# Patient Record
Sex: MALE | Race: WHITE | Employment: FULL TIME | URBAN - METROPOLITAN AREA
[De-identification: names, ages, dates, MRNs, and addresses within clinical notes are randomized per-mention and may not be internally consistent; named-entity substitution may affect disease eponyms.]

---

## 2019-02-24 ENCOUNTER — HOSPITAL ENCOUNTER (INPATIENT)
Facility: HOSPITAL | Age: 29
LOS: 3 days | Discharge: HOME | DRG: 386 | End: 2019-02-27
Attending: EMERGENCY MEDICINE | Admitting: INTERNAL MEDICINE
Payer: COMMERCIAL

## 2019-02-24 ENCOUNTER — APPOINTMENT (OUTPATIENT)
Dept: CT IMAGING | Facility: HOSPITAL | Age: 29
DRG: 386 | End: 2019-02-24
Attending: EMERGENCY MEDICINE
Payer: COMMERCIAL

## 2019-02-24 DIAGNOSIS — K51.00 PANCOLITIS (HCC): Primary | ICD-10-CM

## 2019-02-24 DIAGNOSIS — R50.9 ACUTE FEBRILE ILLNESS: ICD-10-CM

## 2019-02-24 PROBLEM — R73.9 HYPERGLYCEMIA: Status: ACTIVE | Noted: 2019-02-24

## 2019-02-24 PROBLEM — E87.6 HYPOKALEMIA: Status: ACTIVE | Noted: 2019-02-24

## 2019-02-24 LAB
ADENOVIRUS F 40/41 PCR: NEGATIVE
ALBUMIN SERPL-MCNC: 3.3 G/DL (ref 3.4–5)
ALP LIVER SERPL-CCNC: 47 U/L (ref 45–117)
ALT SERPL-CCNC: 16 U/L (ref 16–61)
ANION GAP SERPL CALC-SCNC: 9 MMOL/L (ref 0–18)
AST SERPL-CCNC: 11 U/L (ref 15–37)
ASTROVIRUS PCR: NEGATIVE
BASOPHILS # BLD AUTO: 0.03 X10(3) UL (ref 0–0.2)
BASOPHILS # BLD: 0 X10(3) UL (ref 0–0.2)
BASOPHILS NFR BLD AUTO: 0.5 %
BASOPHILS NFR BLD: 0 %
BILIRUB DIRECT SERPL-MCNC: 0.5 MG/DL (ref 0–0.2)
BILIRUB SERPL-MCNC: 1.8 MG/DL (ref 0.1–2)
BILIRUB UR QL: NEGATIVE
BUN BLD-MCNC: 10 MG/DL (ref 7–18)
BUN/CREAT SERPL: 8.5 (ref 10–20)
C CAYETANENSIS DNA SPEC QL NAA+PROBE: NEGATIVE
C. DIFFICILE TOXIN A/B PCR: NEGATIVE
CALCIUM BLD-MCNC: 8.6 MG/DL (ref 8.5–10.1)
CAMPY SP DNA.DIARRHEA STL QL NAA+PROBE: NEGATIVE
CHLORIDE SERPL-SCNC: 106 MMOL/L (ref 98–107)
CLARITY UR: CLEAR
CO2 SERPL-SCNC: 24 MMOL/L (ref 21–32)
COLOR UR: YELLOW
CREAT BLD-MCNC: 1.17 MG/DL (ref 0.7–1.3)
CRYPTOSP DNA SPEC QL NAA+PROBE: NEGATIVE
DEPRECATED RDW RBC AUTO: 37.9 FL (ref 35.1–46.3)
DEPRECATED RDW RBC AUTO: 38 FL (ref 35.1–46.3)
DOHLE BOD BLD QL SMEAR: PRESENT
EAEC PAA PLAS AGGR+AATA ST NAA+NON-PRB: NEGATIVE
EC STX1+STX2 + H7 FLIC SPEC NAA+PROBE: NEGATIVE
ENTAMOEBA HISTOLYTICA PCR: NEGATIVE
EOSINOPHIL # BLD AUTO: 0 X10(3) UL (ref 0–0.7)
EOSINOPHIL # BLD: 0 X10(3) UL (ref 0–0.7)
EOSINOPHIL NFR BLD AUTO: 0 %
EOSINOPHIL NFR BLD: 0 %
EPEC EAE GENE STL QL NAA+NON-PROBE: NEGATIVE
ERYTHROCYTE [DISTWIDTH] IN BLOOD BY AUTOMATED COUNT: 11.9 % (ref 11–15)
ERYTHROCYTE [DISTWIDTH] IN BLOOD BY AUTOMATED COUNT: 11.9 % (ref 11–15)
ETEC LTA+ST1A+ST1B TOX ST NAA+NON-PROBE: NEGATIVE
GIARDIA LAMBLIA PCR: NEGATIVE
GLUCOSE BLD-MCNC: 119 MG/DL (ref 70–99)
GLUCOSE UR-MCNC: NEGATIVE MG/DL
HCT VFR BLD AUTO: 33.1 % (ref 39–53)
HCT VFR BLD AUTO: 38.3 % (ref 39–53)
HGB BLD-MCNC: 11.7 G/DL (ref 13–17.5)
HGB BLD-MCNC: 13.6 G/DL (ref 13–17.5)
HGB UR QL STRIP.AUTO: NEGATIVE
IMM GRANULOCYTES # BLD AUTO: 0.06 X10(3) UL (ref 0–1)
IMM GRANULOCYTES NFR BLD: 1 %
KETONES UR-MCNC: NEGATIVE MG/DL
LACTATE SERPL-SCNC: 1 MMOL/L (ref 0.4–2)
LACTATE SERPL-SCNC: 1.4 MMOL/L (ref 0.4–2)
LEUKOCYTE ESTERASE UR QL STRIP.AUTO: NEGATIVE
LIPASE SERPL-CCNC: 103 U/L (ref 73–393)
LYMPHOCYTES # BLD AUTO: 0.59 X10(3) UL (ref 1–4)
LYMPHOCYTES NFR BLD AUTO: 9.7 %
LYMPHOCYTES NFR BLD: 1.72 X10(3) UL (ref 1–4)
LYMPHOCYTES NFR BLD: 21 %
M PROTEIN MFR SERPL ELPH: 7.2 G/DL (ref 6.4–8.2)
MCH RBC QN AUTO: 31.3 PG (ref 26–34)
MCH RBC QN AUTO: 31.5 PG (ref 26–34)
MCHC RBC AUTO-ENTMCNC: 35.3 G/DL (ref 31–37)
MCHC RBC AUTO-ENTMCNC: 35.5 G/DL (ref 31–37)
MCV RBC AUTO: 88.5 FL (ref 80–100)
MCV RBC AUTO: 88.7 FL (ref 80–100)
METAMYELOCYTES # BLD: 0.08 X10(3) UL
METAMYELOCYTES NFR BLD: 1 %
MONOCYTES # BLD AUTO: 1.15 X10(3) UL (ref 0.1–1)
MONOCYTES # BLD: 1.31 X10(3) UL (ref 0.1–1)
MONOCYTES NFR BLD AUTO: 18.9 %
MONOCYTES NFR BLD: 16 %
NEUTROPHILS # BLD AUTO: 4.24 X10 (3) UL (ref 1.5–7.7)
NEUTROPHILS # BLD AUTO: 4.24 X10(3) UL (ref 1.5–7.7)
NEUTROPHILS # BLD AUTO: 5.49 X10 (3) UL (ref 1.5–7.7)
NEUTROPHILS NFR BLD AUTO: 69.9 %
NEUTROPHILS NFR BLD: 37 %
NEUTS BAND NFR BLD: 25 %
NEUTS HYPERSEG # BLD: 5.08 X10(3) UL (ref 1.5–7.7)
NEUTS VAC BLD QL SMEAR: PRESENT
NITRITE UR QL STRIP.AUTO: NEGATIVE
NOROVIRUS GI/GII PCR: NEGATIVE
OSMOLALITY SERPL CALC.SUM OF ELEC: 288 MOSM/KG (ref 275–295)
P SHIGELLOIDES DNA STL QL NAA+PROBE: NEGATIVE
PH UR: 5 [PH] (ref 5–8)
PLATELET # BLD AUTO: 163 10(3)UL (ref 150–450)
PLATELET # BLD AUTO: 189 10(3)UL (ref 150–450)
PLATELET MORPHOLOGY: NORMAL
POTASSIUM SERPL-SCNC: 3.1 MMOL/L (ref 3.5–5.1)
PROT UR-MCNC: NEGATIVE MG/DL
RBC # BLD AUTO: 3.74 X10(6)UL (ref 4.3–5.7)
RBC # BLD AUTO: 4.32 X10(6)UL (ref 4.3–5.7)
ROTAVIRUS A PCR: NEGATIVE
SALMONELLA DNA SPEC QL NAA+PROBE: NEGATIVE
SAPOVIRUS PCR: NEGATIVE
SHIGELLA SP+EIEC IPAH ST NAA+NON-PROBE: POSITIVE
SODIUM SERPL-SCNC: 139 MMOL/L (ref 136–145)
SP GR UR STRIP: 1.02 (ref 1–1.03)
TOTAL CELLS COUNTED: 100
TOXIC GRANULES BLD QL SMEAR: PRESENT
UROBILINOGEN UR STRIP-ACNC: <2
V CHOLERAE DNA SPEC QL NAA+PROBE: NEGATIVE
VIBRIO DNA SPEC NAA+PROBE: NEGATIVE
VIT C UR-MCNC: NEGATIVE MG/DL
WBC # BLD AUTO: 6.1 X10(3) UL (ref 4–11)
WBC # BLD AUTO: 8.2 X10(3) UL (ref 4–11)
YERSINIA DNA SPEC NAA+PROBE: NEGATIVE

## 2019-02-24 PROCEDURE — 74177 CT ABD & PELVIS W/CONTRAST: CPT | Performed by: EMERGENCY MEDICINE

## 2019-02-24 PROCEDURE — 99222 1ST HOSP IP/OBS MODERATE 55: CPT | Performed by: INTERNAL MEDICINE

## 2019-02-24 RX ORDER — ONDANSETRON 2 MG/ML
4 INJECTION INTRAMUSCULAR; INTRAVENOUS EVERY 6 HOURS PRN
Status: DISCONTINUED | OUTPATIENT
Start: 2019-02-24 | End: 2019-02-24

## 2019-02-24 RX ORDER — MORPHINE SULFATE 2 MG/ML
2 INJECTION, SOLUTION INTRAMUSCULAR; INTRAVENOUS EVERY 4 HOURS PRN
Status: DISCONTINUED | OUTPATIENT
Start: 2019-02-24 | End: 2019-02-27

## 2019-02-24 RX ORDER — SODIUM CHLORIDE 9 MG/ML
INJECTION, SOLUTION INTRAVENOUS
Status: COMPLETED
Start: 2019-02-24 | End: 2019-02-24

## 2019-02-24 RX ORDER — SODIUM CHLORIDE 9 MG/ML
INJECTION, SOLUTION INTRAVENOUS CONTINUOUS
Status: DISCONTINUED | OUTPATIENT
Start: 2019-02-24 | End: 2019-02-27

## 2019-02-24 RX ORDER — HEPARIN SODIUM 5000 [USP'U]/ML
5000 INJECTION, SOLUTION INTRAVENOUS; SUBCUTANEOUS EVERY 8 HOURS SCHEDULED
Status: DISCONTINUED | OUTPATIENT
Start: 2019-02-24 | End: 2019-02-24

## 2019-02-24 RX ORDER — IBUPROFEN 600 MG/1
600 TABLET ORAL ONCE
Status: COMPLETED | OUTPATIENT
Start: 2019-02-24 | End: 2019-02-24

## 2019-02-24 RX ORDER — ONDANSETRON 2 MG/ML
4 INJECTION INTRAMUSCULAR; INTRAVENOUS ONCE
Status: COMPLETED | OUTPATIENT
Start: 2019-02-24 | End: 2019-02-24

## 2019-02-24 RX ORDER — ONDANSETRON 2 MG/ML
4 INJECTION INTRAMUSCULAR; INTRAVENOUS EVERY 6 HOURS PRN
Status: DISCONTINUED | OUTPATIENT
Start: 2019-02-24 | End: 2019-02-27

## 2019-02-24 RX ORDER — SODIUM CHLORIDE 0.9 % (FLUSH) 0.9 %
3 SYRINGE (ML) INJECTION AS NEEDED
Status: DISCONTINUED | OUTPATIENT
Start: 2019-02-24 | End: 2019-02-27

## 2019-02-24 RX ORDER — ACETAMINOPHEN 325 MG/1
650 TABLET ORAL EVERY 6 HOURS PRN
Status: DISCONTINUED | OUTPATIENT
Start: 2019-02-24 | End: 2019-02-27

## 2019-02-24 RX ORDER — POTASSIUM CHLORIDE 14.9 MG/ML
20 INJECTION INTRAVENOUS ONCE
Status: COMPLETED | OUTPATIENT
Start: 2019-02-25 | End: 2019-02-25

## 2019-02-24 RX ORDER — HEPARIN SODIUM 5000 [USP'U]/ML
5000 INJECTION, SOLUTION INTRAVENOUS; SUBCUTANEOUS EVERY 8 HOURS SCHEDULED
Status: DISCONTINUED | OUTPATIENT
Start: 2019-02-24 | End: 2019-02-27

## 2019-02-24 NOTE — ED NOTES
Orders for admission, patient is aware of plan and ready to go upstairs. Any questions, please call ED RN Meg Saldivar  at extension [phone]    Blood cultures completed. Will give IV Antibiotics prior to coming to the floor.

## 2019-02-24 NOTE — ED PROVIDER NOTES
Patient Seen in: [facility] AND [facility] Emergency Department    History   Patient presents with:  Nausea/Vomiting/Diarrhea (gastrointestinal)    Stated Complaint: N/V/D    HPI    Patient presents the emergency department complaining of 4 days of abdominal eric Cardiovascular: Normal rate and regular rhythm. No murmur heard. Pulmonary/Chest: Effort normal and breath sounds normal. No respiratory distress. Abdominal: Soft. He exhibits no distension. There is tenderness in the left lower quadrant.  There is n Abnormal            Final result                 Please view results for these tests on the individual orders.    URINALYSIS WITH CULTURE REFLEX   SCAN SLIDE   GI STOOL PANEL BY PCR   BLOOD CULTURE   BLOOD CULTURE                MDM   Pulse Ox: 100%, Normal ICD-10-CM Noted POA    Hyperglycemia R73.9 2/24/2019 Yes    Hypokalemia E87.6 2/24/2019 Yes    Pancolitis ([address] K51.00 2/24/2019 Unknown

## 2019-02-24 NOTE — ED INITIAL ASSESSMENT (HPI)
Patient here with lower abdominal pain and N/V/D since Wednesday night with fevers on and off. Denies recent sick contacts.

## 2019-02-24 NOTE — CONSULTS
[facility]    Report of Consultation    Mackenzie Strange Patient Status:  Inpatient    1990 MRN [id number]   Location [facility] 5SW/SE Attending Eze Peoples MD   Hosp Day # 0 PCP Via Cynthia Taylor      Date of Admission: 0.5ml 0.5 mL Intramuscular Prior to discharge       No medications prior to admission.     Allergies  No Known Allergies    Review of Systems:   GENERAL HEALTH +body aches  SKIN: denies any unusual skin lesions or rashes  EYES: no visual complaints or defic obtained with non-ionic intravenous contrast material.  Automated exposure control for dose reduction was used. Adjustment of the mA and/or kV was done based on the patient's size. Use of iterative reconstruction technique for dose reduction was used.    FI Dictated by (CST): Tracie Mi MD on 2/24/2019 at 10:46     Approved by (CST): Tracie Mi MD on 2/24/2019 at 10:55             Impression:   Mr Zarina Engel is a 32 y/o male that presents with n/v and diarrhea.  Possibly bloody stools but patient unsur

## 2019-02-25 PROBLEM — D62 ACUTE BLOOD LOSS ANEMIA: Status: ACTIVE | Noted: 2019-02-25

## 2019-02-25 LAB
ALBUMIN SERPL-MCNC: 2.5 G/DL (ref 3.4–5)
ALBUMIN/GLOB SERPL: 0.8 {RATIO} (ref 1–2)
ALP LIVER SERPL-CCNC: 34 U/L (ref 45–117)
ALT SERPL-CCNC: 11 U/L (ref 16–61)
ANION GAP SERPL CALC-SCNC: 7 MMOL/L (ref 0–18)
AST SERPL-CCNC: 8 U/L (ref 15–37)
BASOPHILS # BLD: 0.06 X10(3) UL (ref 0–0.2)
BASOPHILS NFR BLD: 1 %
BILIRUB SERPL-MCNC: 1.1 MG/DL (ref 0.1–2)
BUN BLD-MCNC: 9 MG/DL (ref 7–18)
BUN/CREAT SERPL: 8.3 (ref 10–20)
CALCIUM BLD-MCNC: 8 MG/DL (ref 8.5–10.1)
CHLORIDE SERPL-SCNC: 109 MMOL/L (ref 98–107)
CO2 SERPL-SCNC: 23 MMOL/L (ref 21–32)
CREAT BLD-MCNC: 1.08 MG/DL (ref 0.7–1.3)
DEPRECATED RDW RBC AUTO: 38.2 FL (ref 35.1–46.3)
DOHLE BOD BLD QL SMEAR: PRESENT
EOSINOPHIL # BLD: 0 X10(3) UL (ref 0–0.7)
EOSINOPHIL NFR BLD: 0 %
ERYTHROCYTE [DISTWIDTH] IN BLOOD BY AUTOMATED COUNT: 11.9 % (ref 11–15)
GLOBULIN PLAS-MCNC: 3.2 G/DL (ref 2.8–4.4)
GLUCOSE BLD-MCNC: 106 MG/DL (ref 70–99)
HAV IGM SER QL: 1.7 MG/DL (ref 1.6–2.6)
HCT VFR BLD AUTO: 30.8 % (ref 39–53)
HGB BLD-MCNC: 10.8 G/DL (ref 13–17.5)
LYMPHOCYTES NFR BLD: 0.91 X10(3) UL (ref 1–4)
LYMPHOCYTES NFR BLD: 16 %
M PROTEIN MFR SERPL ELPH: 5.7 G/DL (ref 6.4–8.2)
MCH RBC QN AUTO: 30.9 PG (ref 26–34)
MCHC RBC AUTO-ENTMCNC: 35.1 G/DL (ref 31–37)
MCV RBC AUTO: 88 FL (ref 80–100)
MONOCYTES # BLD: 0.74 X10(3) UL (ref 0.1–1)
MONOCYTES NFR BLD: 13 %
MORPHOLOGY: NORMAL
NEUTROPHILS # BLD AUTO: 3.66 X10 (3) UL (ref 1.5–7.7)
NEUTROPHILS NFR BLD: 17 %
NEUTS BAND NFR BLD: 53 %
NEUTS HYPERSEG # BLD: 3.99 X10(3) UL (ref 1.5–7.7)
OSMOLALITY SERPL CALC.SUM OF ELEC: 287 MOSM/KG (ref 275–295)
PLATELET # BLD AUTO: 150 10(3)UL (ref 150–450)
PLATELET MORPHOLOGY: NORMAL
POTASSIUM SERPL-SCNC: 3.3 MMOL/L (ref 3.5–5.1)
POTASSIUM SERPL-SCNC: 3.5 MMOL/L (ref 3.5–5.1)
RBC # BLD AUTO: 3.5 X10(6)UL (ref 4.3–5.7)
SODIUM SERPL-SCNC: 139 MMOL/L (ref 136–145)
TOTAL CELLS COUNTED: 100
TOXIC GRANULES BLD QL SMEAR: PRESENT
WBC # BLD AUTO: 5.7 X10(3) UL (ref 4–11)

## 2019-02-25 PROCEDURE — 99232 SBSQ HOSP IP/OBS MODERATE 35: CPT | Performed by: INTERNAL MEDICINE

## 2019-02-25 RX ORDER — MAGNESIUM SULFATE HEPTAHYDRATE 40 MG/ML
2 INJECTION, SOLUTION INTRAVENOUS ONCE
Status: COMPLETED | OUTPATIENT
Start: 2019-02-25 | End: 2019-02-25

## 2019-02-25 NOTE — CONSULTS
INFECTIOUS DISEASE CONSULT NOTE    Mik Robert Patient Status:  Inpatient    1990 MRN [id number]   Location [facility] 5SW/SE Attending Yanely Alcantara MD   Hosp Day # 1 PCP JUAN PABLO WHEELER 650 mg, Oral, Q6H PRN  •  morphINE sulfate (PF) 2 MG/ML injection 2 mg, 2 mg, Intravenous, Q4H PRN  •  Heparin Sodium (Porcine) 5000 UNIT/ML injection 5,000 Units, 5,000 Units, Subcutaneous, Q8H [address]  •  ondansetron HCl (ZOFRAN) injection 4 mg, 4 mg, Nemesio Lang --   139   K  3.1*  3.5  3.3*   CL  106   --   109*   CO2  24.0   --   23.0   ALKPHO  47   --   34*   AST  11*   --   8*   ALT  16   --   11*   BILT  1.8   --   1.1   TP  7.2   --   5.7*       Microbiology    Reviewed in EMR    Radiology: Reviewed      A

## 2019-02-25 NOTE — PROGRESS NOTES
[facility]    Progress Note    Trace Rudd Patient Status:  Inpatient    1990 MRN [id number]   Location [facility] 5SW/SE Attending Diane Francisco MD   Hosp Day # 1 PCP JUAN PABLO Gold        Subjective:   Subjective 30.8 (L) 02/25/2019    .0 02/25/2019    CREATSERUM 1.08 02/25/2019    BUN 9 02/25/2019     02/25/2019    K 3.3 (L) 02/25/2019     (H) 02/25/2019    CO2 23.0 02/25/2019     (H) 02/25/2019    CA 8.0 (L) 02/25/2019    ALB 2.5 (L) 02/

## 2019-02-25 NOTE — PLAN OF CARE
Problem: Patient Centered Care  Goal: Patient preferences are identified and integrated in the patient's plan of care  Interventions:  - What would you like us to know as we care for you? I live with my parents.   - Provide timely, complete, and accurate in mobilization and activity  - Obtain nutritional consult as needed  - Establish a toileting routine/schedule  - Consider collaborating with pharmacy to review patient's medication profile  Outcome: Progressing      Problem: METABOLIC/FLUID AND ELECTROLYTES

## 2019-02-25 NOTE — H&P
[facility]    History and Physical    Paola Shaker Patient Status:  Inpatient    1990 MRN [id number]   Location [facility] 5SW/SE Attending Kathy Mera MD   Hosp Day # 0 PCP JUAN PABLO Gillespie     Date:  2019  Date intolerance. Genitourinary: Negative for dysuria and urgency. Musculoskeletal: Negative for back pain, joint pain and gait problem. Allergic/Immunologic: Negative for food allergies.    Neurological: Negative for dizziness, facial asymmetry and headac Only)(cpt=74177)    Result Date: 2/24/2019  CONCLUSION:   Wall thickening and mucosal hyperemia involving the terminal ileum and colon. Findings are compatible with nonspecific terminal ileitis with pancolitis.  Infectious and inflammatory processes are fa

## 2019-02-25 NOTE — PLAN OF CARE
Problem: Patient/Family Goals  Goal: Patient/Family Long Term Goal  Patient's Long Term Goal: Go home    Interventions:  - Fluid resuscitation  - Antibiotic  - Monitor Lab trends  - Isolation Precautions  - Education for patient and visitors re: prevention

## 2019-02-25 NOTE — PROGRESS NOTES
[facility]  Gastroenterology Progress Note    Paola Jernigan Patient Status:  Inpatient    1990 MRN [id number]   Location [facility] 5SW/SE Attending Kathy Mera MD   Hosp Day # 1 PCP JUAN PABLO Friend     Subjective:  Author Charbel Rashad Lopez MD on 2/24/2019 at 10:46     Approved by (CST): Rashad Lopez MD on 2/24/2019 at 10:55            Problem list:  Patient Active Problem List:     Hypokalemia     Hyperglycemia     Pancolitis ([address]     Acute febrile illness     Acute b

## 2019-02-25 NOTE — PAYOR COMM NOTE
--------------  ADMISSION REVIEW     Payor: [address]  Subscriber #:  [id number]  Authorization Number: [id number]    Admit date: 2/24/19  Admit time: [address]       Admitting Physician: Ewing Kehr, MD  Attending Physician:  Ewing Kehr, Aloha Frieze °F (39.4 °C)   Temp src Oral   SpO2 100 %   O2 Device None (Room air)       Current:/61   Pulse 87   Temp 98.7 °F (37.1 °C) (Oral)   Resp 20   Wt 72.6 kg   SpO2 96%         Physical Exam   Constitutional: He is oriented to person, place, and time.  He Vacuolated Neutrophils Present (*)     All other components within normal limits   CBC W/ DIFFERENTIAL - Abnormal; Notable for the following components:    HCT 38.3 (*)     All other components within normal limits   LIPASE - Normal   LACTIC ACID, PLASMA - Stool specimen obtained. Given IV fluids and blood cultures were sent. Case was discussed with the on-call internal medicine doctor as well as GI. Now that stool culture and blood cultures are obtained, GI recommends initiating a dose of Zosyn.   Res abdominal cramps especially in the lower abdomen. Denied any chest pain or shortness of breath. Past medical history none  Past surgical history none    Family history denied any cancer. Mom has a TIA. Mom has cardiomyopathy.   He does not smoke  Histor normal heart sounds and intact distal pulses. No murmur heard. Edema not present. Pulmonary/Chest: Effort normal and breath sounds normal. He has no rales. Abdominal: Soft. Bowel sounds are normal. There is no tenderness.  There is no rebound and no infectious diarrhea from [place], GI consulted.   We will continue with IV fluids and IV antibiotics      Hypokalemia secondary from diarrhea will follow electrolyte protocol        Acute febrile illness secondary from above    Lactic acidosis resolved - Date Action Dose Route User    2/25/2019 2051 [facility] (none) [facility]    2/25/2019 0111 New Bag (none) Intravenous Seema Uribe RN               Antibiotics: IV ceftriaxone; (IV zosyn)        ASSESSMENT:  67-year-old male with no

## 2019-02-26 LAB
ANION GAP SERPL CALC-SCNC: 7 MMOL/L (ref 0–18)
BUN BLD-MCNC: 6 MG/DL (ref 7–18)
BUN/CREAT SERPL: 6.9 (ref 10–20)
CALCIUM BLD-MCNC: 8 MG/DL (ref 8.5–10.1)
CHLORIDE SERPL-SCNC: 108 MMOL/L (ref 98–107)
CO2 SERPL-SCNC: 25 MMOL/L (ref 21–32)
CREAT BLD-MCNC: 0.87 MG/DL (ref 0.7–1.3)
DEPRECATED RDW RBC AUTO: 38.4 FL (ref 35.1–46.3)
ERYTHROCYTE [DISTWIDTH] IN BLOOD BY AUTOMATED COUNT: 11.9 % (ref 11–15)
GLUCOSE BLD-MCNC: 94 MG/DL (ref 70–99)
HAV AB SER QL IA: NONREACTIVE
HAV IGM SER QL: 2.2 MG/DL (ref 1.6–2.6)
HBV CORE AB SERPL QL IA: NONREACTIVE
HBV SURFACE AB SER QL: REACTIVE
HBV SURFACE AB SERPL IA-ACNC: 25.71 MIU/ML
HBV SURFACE AG SERPL QL IA: NONREACTIVE
HCT VFR BLD AUTO: 31.8 % (ref 39–53)
HCV AB SERPL QL IA: NONREACTIVE
HGB BLD-MCNC: 10.9 G/DL (ref 13–17.5)
MCH RBC QN AUTO: 30.5 PG (ref 26–34)
MCHC RBC AUTO-ENTMCNC: 34.3 G/DL (ref 31–37)
MCV RBC AUTO: 89.1 FL (ref 80–100)
OSMOLALITY SERPL CALC.SUM OF ELEC: 287 MOSM/KG (ref 275–295)
PLATELET # BLD AUTO: 172 10(3)UL (ref 150–450)
POTASSIUM SERPL-SCNC: 3.1 MMOL/L (ref 3.5–5.1)
POTASSIUM SERPL-SCNC: 3.1 MMOL/L (ref 3.5–5.1)
POTASSIUM SERPL-SCNC: 3.3 MMOL/L (ref 3.5–5.1)
RBC # BLD AUTO: 3.57 X10(6)UL (ref 4.3–5.7)
SODIUM SERPL-SCNC: 140 MMOL/L (ref 136–145)
WBC # BLD AUTO: 5.1 X10(3) UL (ref 4–11)

## 2019-02-26 PROCEDURE — 99231 SBSQ HOSP IP/OBS SF/LOW 25: CPT | Performed by: INTERNAL MEDICINE

## 2019-02-26 RX ORDER — POTASSIUM CHLORIDE 14.9 MG/ML
20 INJECTION INTRAVENOUS ONCE
Status: COMPLETED | OUTPATIENT
Start: 2019-02-26 | End: 2019-02-26

## 2019-02-26 NOTE — PROGRESS NOTES
[facility] AND [facility]  Gastroenterology Progress Note    Virl Distad Patient Status:  Inpatient    1990 MRN [id number]   Location [facility] 5SW/SE Attending Sukhwinder Dixon MD   Hosp Day # 2 PCP JUAN PABLO Pascual     Subjective:  Vazquez Young

## 2019-02-26 NOTE — PROGRESS NOTES
[facility]    Progress Note    Bienvenido Lopez Patient Status:  Inpatient    1990 MRN [id number]   Location [facility] 5SW/SE Attending Ellen Rojas MD   Hosp Day # 2 PCP JUAN PABLO Pinto        Subjective:   Subjective Results   Component Value Date    WBC 5.1 02/26/2019    HGB 10.9 (L) 02/26/2019    HCT 31.8 (L) 02/26/2019    .0 02/26/2019    CREATSERUM 0.87 02/26/2019    BUN 6 (L) 02/26/2019     02/26/2019    K 3.1 (L) 02/26/2019    K 3.1 (L) 02/26/2019

## 2019-02-26 NOTE — PROGRESS NOTES
[facility] ID PROGRESS NOTE    Kellen Rainey Patient Status:  Inpatient    1990 MRN [id number]   Location [facility] 5SW/SE Attending Agnes Barajas MD   Hosp Day # 2 PCP JUAN PABLO Starr     Subjective:  Awake, reports decreased stool amount. terminal ileum and colon compatible with nonspecific terminal ileitis and pancolitis. Started on IV Zosyn. Blood culture sent and pending. He has stool PCR positive for enteroinvasive E. Coli.     # Acute diarrhea with pathogenic E.  Coli EIEC w/o bloody

## 2019-02-26 NOTE — PAYOR COMM NOTE
--------------  CONTINUED STAY REVIEW---REQUESTING ADDITIONAL DAY 2/26      Payor: [address]  Subscriber #:  [id number]  Authorization Number: [id number]    Admit date: 2/24/19  Admit time: [address]    Admitting Physician: Humza Pimentel MD  Attestefania Pancolitis ([facility] [address] -secondary from E. coli-overall his symptoms are improving. Continue to have multiple bowel movements. Today morning he already had 2 bowel movements. We will continue with IV ceftriaxone.   Once the stool is formed hopefully will be a 2/25/2019 1642 New Bag 2 g Intravenous Render Chuy RN      potassium chloride 40 mEq in sodium chloride 0.9% 250 mL IVPB     Date Action Dose Route User    2/26/2019 1010 New Bag 40 mEq Intravenous Render KIN Vera      0.9%  NaCl infusion     Date Acti

## 2019-02-26 NOTE — PLAN OF CARE
Problem: Patient/Family Goals  Goal: Patient/Family Short Term Goal  Patient's Short Term Goal: Stop diarrhea and pain    Interventions:   - Pain control and medications  - IV Fluids  - Isolation precautions  - Clear liquid diet  - See additional Care Plan safety including physical limitations  - Instruct pt to call for assistance with activity based on assessment  - Modify environment to reduce risk of injury  - Provide assistive devices as appropriate  - Consider OT/PT consult to assist with strengthening/

## 2019-02-27 VITALS
HEART RATE: 68 BPM | SYSTOLIC BLOOD PRESSURE: 123 MMHG | WEIGHT: 164.19 LBS | RESPIRATION RATE: 20 BRPM | DIASTOLIC BLOOD PRESSURE: 63 MMHG | OXYGEN SATURATION: 97 % | HEIGHT: 70 IN | BODY MASS INDEX: 23.51 KG/M2 | TEMPERATURE: 98 F

## 2019-02-27 LAB
ANION GAP SERPL CALC-SCNC: 7 MMOL/L (ref 0–18)
BUN BLD-MCNC: 7 MG/DL (ref 7–18)
BUN/CREAT SERPL: 9.6 (ref 10–20)
CALCIUM BLD-MCNC: 8.2 MG/DL (ref 8.5–10.1)
CHLORIDE SERPL-SCNC: 111 MMOL/L (ref 98–107)
CO2 SERPL-SCNC: 23 MMOL/L (ref 21–32)
CREAT BLD-MCNC: 0.73 MG/DL (ref 0.7–1.3)
GLUCOSE BLD-MCNC: 89 MG/DL (ref 70–99)
OSMOLALITY SERPL CALC.SUM OF ELEC: 289 MOSM/KG (ref 275–295)
POTASSIUM SERPL-SCNC: 4 MMOL/L (ref 3.5–5.1)
SODIUM SERPL-SCNC: 141 MMOL/L (ref 136–145)

## 2019-02-27 PROCEDURE — 99238 HOSP IP/OBS DSCHRG MGMT 30/<: CPT | Performed by: INTERNAL MEDICINE

## 2019-02-27 RX ORDER — CIPROFLOXACIN 500 MG/1
500 TABLET, FILM COATED ORAL 2 TIMES DAILY
Qty: 6 TABLET | Refills: 0 | Status: SHIPPED | OUTPATIENT
Start: 2019-02-27 | End: 2019-03-02

## 2019-02-27 NOTE — DISCHARGE SUMMARY
[facility]    Discharge Summary    Kellen Rainey Patient Status:  Inpatient    1990 MRN [id number]   Location [facility] 5SW/SE Attending Agnes Barajas MD   Hosp Day # 3 PCP JUAN PABLO Malloy     Date of Admission:  bowel sounds. No rebound tenderness  Neurologic: No focal neurological deficits. Musculoskeletal: Motor strength 5/5  Integument: No lesions. No erythema. Psychiatric: Appropriate mood and affect.     Pancolitis ([address] -secondary from E. coli-overall his s 21  59-90 High Risk  29-58 Medium Risk  0-28   Low Risk. TCM Follow-Up Recommendation:  LACE < 29: Low Risk of readmission after discharge from the hospital. No TCM follow-up needed.     Time spent:  Less than 30 minutes, more than 50% of the time was sp

## 2019-02-27 NOTE — PLAN OF CARE
Patient discharge home with mother at bedside, IV removed, Informed patient of discharge plan, inform pt to follow up with PCP. Patient verbalized understanding of discharge.

## 2019-02-27 NOTE — PROGRESS NOTES
[facility]ESS [facility]    Rochelle Kumar Patient Status:  Inpatient    1990 MRN [id number]   Location [facility] 5SW/SE Attending Mika Soni MD   Hosp Day # 3 PCP JUAN PABLO GARCIA     Subjective:  Tmax 99.0. Five BMs overnight.  Three B febrile to 103 with WBC of 8.2. CT A/P with wall thickening and mucosal hyperemia of the terminal ileum and colon compatible with nonspecific terminal ileitis and pancolitis. Started on IV Zosyn. Blood culture sent and pending.   He has stool PCR positiv

## 2019-02-27 NOTE — PLAN OF CARE
Problem: Patient Centered Care  Goal: Patient preferences are identified and integrated in the patient's plan of care  Interventions:  - What would you like us to know as we care for you? I live with my parents.   - Provide timely, complete, and accurate in effectiveness of GI medications  - Encourage mobilization and activity  - Obtain nutritional consult as needed  - Establish a toileting routine/schedule  - Consider collaborating with pharmacy to review patient's medication profile  Outcome: Adequate for D

## 2019-02-28 NOTE — PAYOR COMM NOTE
--------------  DISCHARGE REVIEW    Payor: [address] [facility]  Subscriber #:  [id number]  Authorization Number: [id number]    Admit date: 2/24/19  Admit time:  2946  Discharge Date: 2/27/2019  5:19 PM     Admitting Physician: Lori Arita MD  Attend morning he had 2 small episodes of pasty stool. Denied any abdominal pain. Denied any fever. No more blood in the stool.     Discharge Physical Exam:  Vital Signs:  Blood pressure 134/55, pulse 54, temperature 98.3 °F (36.8 °C), temperature source Oral, tolerated    Follow up: Follow-up Information     Edwina Cronin MD In 1 week.     Specialty:  Internal Medicine                   Follow up Labs: Check H&H and BMP in 1 week        Other Discharge Instructions:       Please take the antibiotics as pr

## 2019-03-18 ENCOUNTER — OFFICE VISIT (OUTPATIENT)
Dept: INTERNAL MEDICINE CLINIC | Facility: CLINIC | Age: 29
End: 2019-03-18
Payer: COMMERCIAL

## 2019-03-18 VITALS
HEIGHT: 69 IN | BODY MASS INDEX: 23.99 KG/M2 | WEIGHT: 162 LBS | DIASTOLIC BLOOD PRESSURE: 65 MMHG | SYSTOLIC BLOOD PRESSURE: 126 MMHG | HEART RATE: 61 BPM

## 2019-03-18 DIAGNOSIS — Z00.00 ADULT GENERAL MEDICAL EXAM: Primary | ICD-10-CM

## 2019-03-18 PROBLEM — R73.9 HYPERGLYCEMIA: Status: RESOLVED | Noted: 2019-02-24 | Resolved: 2019-03-18

## 2019-03-18 PROBLEM — R50.9 ACUTE FEBRILE ILLNESS: Status: RESOLVED | Noted: 2019-02-24 | Resolved: 2019-03-18

## 2019-03-18 PROBLEM — E87.6 HYPOKALEMIA: Status: RESOLVED | Noted: 2019-02-24 | Resolved: 2019-03-18

## 2019-03-18 PROBLEM — D62 ACUTE BLOOD LOSS ANEMIA: Status: RESOLVED | Noted: 2019-02-25 | Resolved: 2019-03-18

## 2019-03-18 PROBLEM — K51.00 PANCOLITIS (HCC): Status: RESOLVED | Noted: 2019-02-24 | Resolved: 2019-03-18

## 2019-03-18 PROCEDURE — 1111F DSCHRG MED/CURRENT MED MERGE: CPT | Performed by: INTERNAL MEDICINE

## 2019-03-18 PROCEDURE — 99395 PREV VISIT EST AGE 18-39: CPT | Performed by: INTERNAL MEDICINE

## 2019-03-18 NOTE — PROGRESS NOTES
Renita Green is a 33year old male. Patient presents with:  Hospital F/U: GI problems, much better now  Follow-up in the hospital and physical.  HPI:   20-year-old gentleman here for follow-up physical.  I met him when he was in the hospital for gastroenter Cardiovascular: Negative for chest pain, palpitations and leg swelling. Gastrointestinal: Negative for vomiting, abdominal pain, diarrhea, constipation, blood in stool and abdominal distention.    Endocrine: Negative for cold intolerance and heat intole indicates understanding of these issues and agrees to the plan. No Follow-up on file.

## 2019-07-18 ENCOUNTER — OFFICE VISIT (OUTPATIENT)
Dept: INTERNAL MEDICINE CLINIC | Facility: CLINIC | Age: 29
End: 2019-07-18
Payer: COMMERCIAL

## 2019-07-18 VITALS
WEIGHT: 170 LBS | HEART RATE: 60 BPM | BODY MASS INDEX: 25.18 KG/M2 | DIASTOLIC BLOOD PRESSURE: 78 MMHG | HEIGHT: 69 IN | OXYGEN SATURATION: 97 % | SYSTOLIC BLOOD PRESSURE: 133 MMHG

## 2019-07-18 DIAGNOSIS — R00.2 PALPITATION: ICD-10-CM

## 2019-07-18 DIAGNOSIS — Z82.0: Primary | ICD-10-CM

## 2019-07-18 DIAGNOSIS — Z82.49: ICD-10-CM

## 2019-07-18 LAB
ANION GAP SERPL CALC-SCNC: 7 MMOL/L (ref 0–18)
BUN BLD-MCNC: 16 MG/DL (ref 7–18)
BUN/CREAT SERPL: 15.7 (ref 10–20)
CALCIUM BLD-MCNC: 9.7 MG/DL (ref 8.5–10.1)
CHLORIDE SERPL-SCNC: 104 MMOL/L (ref 98–112)
CO2 SERPL-SCNC: 28 MMOL/L (ref 21–32)
CREAT BLD-MCNC: 1.02 MG/DL (ref 0.7–1.3)
GLUCOSE BLD-MCNC: 91 MG/DL (ref 70–99)
OSMOLALITY SERPL CALC.SUM OF ELEC: 289 MOSM/KG (ref 275–295)
PATIENT FASTING: YES
POTASSIUM SERPL-SCNC: 4.1 MMOL/L (ref 3.5–5.1)
SODIUM SERPL-SCNC: 139 MMOL/L (ref 136–145)
TSI SER-ACNC: 1.25 MIU/ML (ref 0.36–3.74)

## 2019-07-18 PROCEDURE — 36415 COLL VENOUS BLD VENIPUNCTURE: CPT | Performed by: INTERNAL MEDICINE

## 2019-07-18 PROCEDURE — 99213 OFFICE O/P EST LOW 20 MIN: CPT | Performed by: INTERNAL MEDICINE

## 2019-07-18 NOTE — PROGRESS NOTES
Canelo Cobos is a 33year old male. Patient presents with:  Heartburn: discomfort on L-side of chest, rapid heart beats, several episodes in the day, lasts about 10 seconds  Tics: c/o daily muscular twitching throughout the body, it is tiring,     HPI:   28 Negative for chest pain. Gastrointestinal: Negative for vomiting, abdominal pain and abdominal distention. Genitourinary: Negative for hematuria. Skin: Negative for wound. Psychiatric/Behavioral: Negative for behavioral problems.      Wt Readings fr

## 2019-07-19 ENCOUNTER — TELEPHONE (OUTPATIENT)
Dept: INTERNAL MEDICINE CLINIC | Facility: CLINIC | Age: 29
End: 2019-07-19

## 2019-07-19 NOTE — TELEPHONE ENCOUNTER
Patient calling was seen yesterday and given referral for genetic counselor patient called for appointment and they only see cancer patients.     Patient requesting new referral.

## 2019-07-19 NOTE — TELEPHONE ENCOUNTER
I will try to figure out what he can go for genetic testing. I need a week time. Please let patient know.   Thank you

## 2019-09-12 ENCOUNTER — HOSPITAL ENCOUNTER (OUTPATIENT)
Age: 29
Discharge: ANOTHER HOSPITAL | End: 2019-09-12
Attending: EMERGENCY MEDICINE
Payer: COMMERCIAL

## 2019-09-12 ENCOUNTER — HOSPITAL ENCOUNTER (EMERGENCY)
Facility: HOSPITAL | Age: 29
Discharge: HOME | End: 2019-09-12
Attending: EMERGENCY MEDICINE
Payer: COMMERCIAL

## 2019-09-12 ENCOUNTER — APPOINTMENT (OUTPATIENT)
Dept: GENERAL RADIOLOGY | Facility: HOSPITAL | Age: 29
End: 2019-09-12
Attending: EMERGENCY MEDICINE
Payer: COMMERCIAL

## 2019-09-12 VITALS
RESPIRATION RATE: 16 BRPM | OXYGEN SATURATION: 97 % | BODY MASS INDEX: 24 KG/M2 | TEMPERATURE: 97 F | HEART RATE: 51 BPM | DIASTOLIC BLOOD PRESSURE: 69 MMHG | SYSTOLIC BLOOD PRESSURE: 127 MMHG | WEIGHT: 165 LBS

## 2019-09-12 VITALS
WEIGHT: 160 LBS | HEIGHT: 69 IN | SYSTOLIC BLOOD PRESSURE: 134 MMHG | DIASTOLIC BLOOD PRESSURE: 88 MMHG | TEMPERATURE: 98 F | BODY MASS INDEX: 23.7 KG/M2 | OXYGEN SATURATION: 92 % | HEART RATE: 55 BPM | RESPIRATION RATE: 12 BRPM

## 2019-09-12 DIAGNOSIS — R00.2 PALPITATIONS: Primary | ICD-10-CM

## 2019-09-12 DIAGNOSIS — R07.9 CHEST PAIN OF UNCERTAIN ETIOLOGY: ICD-10-CM

## 2019-09-12 DIAGNOSIS — R07.9 ACUTE CHEST PAIN: Primary | ICD-10-CM

## 2019-09-12 LAB
ANION GAP SERPL CALC-SCNC: 8 MMOL/L (ref 0–18)
BASOPHILS # BLD AUTO: 0.03 X10(3) UL (ref 0–0.2)
BASOPHILS NFR BLD AUTO: 0.5 %
BUN BLD-MCNC: 16 MG/DL (ref 7–18)
BUN/CREAT SERPL: 15 (ref 10–20)
CALCIUM BLD-MCNC: 9.4 MG/DL (ref 8.5–10.1)
CHLORIDE SERPL-SCNC: 107 MMOL/L (ref 98–112)
CO2 SERPL-SCNC: 29 MMOL/L (ref 21–32)
CREAT BLD-MCNC: 1.07 MG/DL (ref 0.7–1.3)
D DIMER PPP FEU-MCNC: <0.27 UG/ML FEU (ref ?–0.5)
DEPRECATED RDW RBC AUTO: 36.4 FL (ref 35.1–46.3)
EOSINOPHIL # BLD AUTO: 0.22 X10(3) UL (ref 0–0.7)
EOSINOPHIL NFR BLD AUTO: 3.6 %
ERYTHROCYTE [DISTWIDTH] IN BLOOD BY AUTOMATED COUNT: 11.3 % (ref 11–15)
GLUCOSE BLD-MCNC: 92 MG/DL (ref 70–99)
HAV IGM SER QL: 2.3 MG/DL (ref 1.6–2.6)
HCT VFR BLD AUTO: 41.7 % (ref 39–53)
HGB BLD-MCNC: 15.2 G/DL (ref 13–17.5)
IMM GRANULOCYTES # BLD AUTO: 0.01 X10(3) UL (ref 0–1)
IMM GRANULOCYTES NFR BLD: 0.2 %
LYMPHOCYTES # BLD AUTO: 1.78 X10(3) UL (ref 1–4)
LYMPHOCYTES NFR BLD AUTO: 29 %
MCH RBC QN AUTO: 32.3 PG (ref 26–34)
MCHC RBC AUTO-ENTMCNC: 36.5 G/DL (ref 31–37)
MCV RBC AUTO: 88.5 FL (ref 80–100)
MONOCYTES # BLD AUTO: 0.44 X10(3) UL (ref 0.1–1)
MONOCYTES NFR BLD AUTO: 7.2 %
NEUTROPHILS # BLD AUTO: 3.65 X10 (3) UL (ref 1.5–7.7)
NEUTROPHILS # BLD AUTO: 3.65 X10(3) UL (ref 1.5–7.7)
NEUTROPHILS NFR BLD AUTO: 59.5 %
OSMOLALITY SERPL CALC.SUM OF ELEC: 299 MOSM/KG (ref 275–295)
PLATELET # BLD AUTO: 195 10(3)UL (ref 150–450)
POTASSIUM SERPL-SCNC: 3.8 MMOL/L (ref 3.5–5.1)
RBC # BLD AUTO: 4.71 X10(6)UL (ref 4.3–5.7)
SODIUM SERPL-SCNC: 144 MMOL/L (ref 136–145)
TROPONIN I SERPL-MCNC: <0.045 NG/ML (ref ?–0.04)
WBC # BLD AUTO: 6.1 X10(3) UL (ref 4–11)

## 2019-09-12 PROCEDURE — 71046 X-RAY EXAM CHEST 2 VIEWS: CPT | Performed by: EMERGENCY MEDICINE

## 2019-09-12 PROCEDURE — 93005 ELECTROCARDIOGRAM TRACING: CPT

## 2019-09-12 PROCEDURE — 99214 OFFICE O/P EST MOD 30 MIN: CPT

## 2019-09-12 PROCEDURE — 36415 COLL VENOUS BLD VENIPUNCTURE: CPT

## 2019-09-12 PROCEDURE — 84484 ASSAY OF TROPONIN QUANT: CPT | Performed by: EMERGENCY MEDICINE

## 2019-09-12 PROCEDURE — 83735 ASSAY OF MAGNESIUM: CPT | Performed by: EMERGENCY MEDICINE

## 2019-09-12 PROCEDURE — 85379 FIBRIN DEGRADATION QUANT: CPT | Performed by: EMERGENCY MEDICINE

## 2019-09-12 PROCEDURE — 85025 COMPLETE CBC W/AUTO DIFF WBC: CPT | Performed by: EMERGENCY MEDICINE

## 2019-09-12 PROCEDURE — 80048 BASIC METABOLIC PNL TOTAL CA: CPT | Performed by: EMERGENCY MEDICINE

## 2019-09-12 PROCEDURE — 93010 ELECTROCARDIOGRAM REPORT: CPT | Performed by: EMERGENCY MEDICINE

## 2019-09-12 PROCEDURE — 93010 ELECTROCARDIOGRAM REPORT: CPT

## 2019-09-12 PROCEDURE — 99284 EMERGENCY DEPT VISIT MOD MDM: CPT

## 2019-09-12 NOTE — ED INITIAL ASSESSMENT (HPI)
Pt states feels like my heart is \"jumping\" and numbness into left arm, sob and feels faint when when it happens. Has had a lot of episodes in the last month. Is seeing a provider and has a schedule echo.

## 2019-09-12 NOTE — ED PROVIDER NOTES
[facility]    History   Patient presents with:  Chest Pain Angina (cardiovascular)    Stated Complaint: \"chest pain, tachy, dizzy\"    HPI    The patient states he has been having a sensation of chest di Resp 16   Temp 97 °F (36.1 °C)   Temp src Oral   SpO2 97 %   O2 Device None (Room air)       Current:/69   Pulse 51   Temp 97 °F (36.1 °C) (Oral)   Resp 16   Wt 74.8 kg   SpO2 97%   BMI 24.37 kg/m²         Physical Exam    Patient is awake and aler

## 2019-09-13 NOTE — ED PROVIDER NOTES
Patient Seen in: [facility] AND [facility] Emergency Department    History   Patient presents with:  Chest Pain Angina (cardiovascular)  Arrythmia/Palpitations (cardiovascular)    Stated Complaint: chest pain and palpitations, sent by SUZE UMANA    60-year-old m All other systems reviewed and are negative. Positive for stated complaint: chest pain and palpitations, sent by IC   Other systems are as noted in HPI. Constitutional and vital signs reviewed.       All other systems reviewed and negative except as Calculated Osmolality 299 (*)     All other components within normal limits   TROPONIN I - Normal   MAGNESIUM - Normal   D-DIMER - Normal   CBC WITH DIFFERENTIAL WITH PLATELET    Narrative:      The following orders were created for panel order CBC WITH DIF He is encouraged to continue following up with his primary care provider. He is to avoid any caffeine or nicotine.      Imaging:   Xr Chest Pa + Lat Chest (cpt=71046)    Result Date: 9/12/2019  PROCEDURE: XR CHEST PA + LAT CHEST (CPT=71020)  COMPARISON: N

## 2019-09-13 NOTE — ED INITIAL ASSESSMENT (HPI)
L-sided chest pain associated with palpitations x 2 months. Pt states symptoms worsened today. +SOB.  Denies N/V

## 2019-09-13 NOTE — ED NOTES
Pt c/o intermittent sternal cp for several weeks. Pt states that when the pain comes, he feels dizzy & sob. Pt states that he has the pain several times a day.  Pt aware to let this RN know if he has the pain again so we can monitor his rhythm on the cardia

## 2019-09-30 ENCOUNTER — HOSPITAL ENCOUNTER (OUTPATIENT)
Dept: CV DIAGNOSTICS | Facility: HOSPITAL | Age: 29
Discharge: HOME | End: 2019-09-30
Attending: INTERNAL MEDICINE
Payer: COMMERCIAL

## 2019-09-30 DIAGNOSIS — R00.2 PALPITATION: ICD-10-CM

## 2019-09-30 PROCEDURE — 93306 TTE W/DOPPLER COMPLETE: CPT | Performed by: INTERNAL MEDICINE

## 2019-10-04 ENCOUNTER — NURSE TRIAGE (OUTPATIENT)
Dept: OTHER | Age: 29
End: 2019-10-04

## 2019-10-04 NOTE — TELEPHONE ENCOUNTER
Action Requested: Summary for Provider     []  Critical Lab, Recommendations Needed  [] Need Additional Advice  []   FYI    []   Need Orders  [] Need Medications Sent to Pharmacy  []  Other     SUMMARY: Pt report rectal pain and bleeding x 2 days.  Pt state

## 2019-10-05 ENCOUNTER — APPOINTMENT (OUTPATIENT)
Dept: LAB | Age: 29
End: 2019-10-05
Attending: INTERNAL MEDICINE
Payer: COMMERCIAL

## 2019-10-05 ENCOUNTER — OFFICE VISIT (OUTPATIENT)
Dept: INTERNAL MEDICINE CLINIC | Facility: CLINIC | Age: 29
End: 2019-10-05
Payer: COMMERCIAL

## 2019-10-05 VITALS
SYSTOLIC BLOOD PRESSURE: 132 MMHG | BODY MASS INDEX: 25.48 KG/M2 | TEMPERATURE: 98 F | DIASTOLIC BLOOD PRESSURE: 77 MMHG | RESPIRATION RATE: 16 BRPM | HEIGHT: 69 IN | WEIGHT: 172 LBS | HEART RATE: 62 BPM

## 2019-10-05 DIAGNOSIS — K62.5 RECTAL BLEEDING: Primary | ICD-10-CM

## 2019-10-05 DIAGNOSIS — K62.5 RECTAL BLEEDING: ICD-10-CM

## 2019-10-05 PROCEDURE — 99214 OFFICE O/P EST MOD 30 MIN: CPT | Performed by: INTERNAL MEDICINE

## 2019-10-05 PROCEDURE — 85027 COMPLETE CBC AUTOMATED: CPT

## 2019-10-05 PROCEDURE — 36415 COLL VENOUS BLD VENIPUNCTURE: CPT

## 2019-10-05 NOTE — PROGRESS NOTES
Patient ID: Jose Lopez is a 33year old male. Patient presents with:  Hemorrhoids       HISTORY OF PRESENT ILLNESS:   HPI  Patient presents for above. Here with 2 day history of rectal bleeding.   Has noted about a month long history of constip Non-medical: Not on file    Tobacco Use      Smoking status: Never Smoker      Smokeless tobacco: Current User    Substance and Sexual Activity      Alcohol use: Yes        Frequency: 2-4 times a month      Drug use: No      Sexual activity: Not on file improve.     Felicity Petit MD  10/5/2019

## 2019-10-05 NOTE — PATIENT INSTRUCTIONS
1.  Get labs done today. 2.  Increase fiber in your diet. Try to eat more vegetables and fruits. 3.  Okay to begin taking over-the-counter milk of magnesia to help with constipation.   4.  If bleeding persists please call the office back immediately for to greater amounts of stool that appear black or tarry   Rectal bleeding can also happen without pain. Date Last Reviewed: 7/1/2016  © 4102-4373 [employer]. [address] [facility], [address] [place] [address]. All rights reserved.  This information is

## 2019-10-28 ENCOUNTER — TELEPHONE (OUTPATIENT)
Dept: CARDIOLOGY CLINIC | Facility: CLINIC | Age: 29
End: 2019-10-28

## 2019-10-28 ENCOUNTER — OFFICE VISIT (OUTPATIENT)
Dept: CARDIOLOGY CLINIC | Facility: CLINIC | Age: 29
End: 2019-10-28
Payer: COMMERCIAL

## 2019-10-28 VITALS
HEART RATE: 55 BPM | BODY MASS INDEX: 25.77 KG/M2 | DIASTOLIC BLOOD PRESSURE: 73 MMHG | TEMPERATURE: 98 F | HEIGHT: 69 IN | WEIGHT: 174 LBS | SYSTOLIC BLOOD PRESSURE: 123 MMHG

## 2019-10-28 DIAGNOSIS — R00.2 PALPITATIONS: Primary | ICD-10-CM

## 2019-10-28 DIAGNOSIS — I07.1 TRICUSPID VALVE INSUFFICIENCY, UNSPECIFIED ETIOLOGY: ICD-10-CM

## 2019-10-28 DIAGNOSIS — I27.20 PULMONARY HYPERTENSION (HCC): ICD-10-CM

## 2019-10-28 DIAGNOSIS — I51.7 RIGHT VENTRICULAR DILATION: ICD-10-CM

## 2019-10-28 PROBLEM — I37.1 MILD PULMONIC REGURGITATION AND RV DILATION BY PRIOR ECHOCARDIOGRAM: Status: ACTIVE | Noted: 2019-10-28

## 2019-10-28 PROCEDURE — 99245 OFF/OP CONSLTJ NEW/EST HI 55: CPT | Performed by: INTERNAL MEDICINE

## 2019-10-28 NOTE — H&P
[facility]    Cardiac Electrophysiology Consultation  10/28/2019    Name:  aKrly Friend  : 1990    Date of consultation:   10/28/2019    Referring physician: Dr. Lubna Francisco    Reason for Consultation:  Abnormal echocardiogram    History hematuria  NEURO: denies headaches, focal weaknesses or paresthesias    Physical Exam:  Vital Signs: /73 (BP Location: Right arm, Patient Position: Sitting, Cuff Size: adult)   Pulse 55   Temp 98.1 °F (36.7 °C) (Oral)   Ht 5' 9\" (1.753 m)   Wt 174 l BUNCREA 15.0 09/12/2019    CREATSERUM 1.07 09/12/2019    ANIONGAP 8 09/12/2019    GFRNAA 95 09/12/2019    GFRAA 109 09/12/2019    CA 9.4 09/12/2019    OSMOCALC 299 (H) 09/12/2019    ALKPHO 34 (L) 02/25/2019    AST 8 (L) 02/25/2019    ALT 11 (L) 02/25/2019 available. Thank you for the consultation.      Justine Nageotte, MD  Cardiology/Cardiac Electrophysiology  10/28/2019

## 2019-10-28 NOTE — PATIENT INSTRUCTIONS
-event monitor  -cardiac MRI  -obtain your mother's records from cardiologist and electrophysiologist for specific names of her diagnoses, any genetic conditions, reason for her ICD  -return to discuss all of the above with Dr Brandt Jones

## 2019-10-28 NOTE — TELEPHONE ENCOUNTER
..Event monitor ordered from [employer] to be mailed today. Instructed patient on how to place/remove device and care for the monitor. Instructed patient to notify ordering physician if symptoms worsen  during monitoring.  Patient was instructed to call Ca

## 2019-10-30 ENCOUNTER — TELEPHONE (OUTPATIENT)
Dept: CARDIOLOGY CLINIC | Facility: CLINIC | Age: 29
End: 2019-10-30

## 2019-10-30 NOTE — TELEPHONE ENCOUNTER
Called [employer] to submit PA. Spoke with representative [place] with [employer]. Submitted clinical information over the phone. Test has been authorized for dates 10/30/19-11/28/19. Order # [id number]. Notified pt via [employer].

## 2019-10-30 NOTE — TELEPHONE ENCOUNTER
Scheduled for 11/8/19.       Order Date: Oct 28, 2019  Expected Date: 10/28/2019  Authorizing Provider: Aleisha Isbell MD     Procedure: MRI CARDIAC FOR [facility] (BOT=[id number]) [[id number]] ([id number])  Order #: [id number]      Priority: Routine      Class: EHV - RFL

## 2019-11-08 ENCOUNTER — HOSPITAL ENCOUNTER (OUTPATIENT)
Dept: MRI IMAGING | Facility: HOSPITAL | Age: 29
Discharge: HOME | End: 2019-11-08
Attending: INTERNAL MEDICINE
Payer: COMMERCIAL

## 2019-11-08 DIAGNOSIS — I07.1 TRICUSPID VALVE INSUFFICIENCY, UNSPECIFIED ETIOLOGY: ICD-10-CM

## 2019-11-08 DIAGNOSIS — I27.20 PULMONARY HYPERTENSION (HCC): ICD-10-CM

## 2019-11-08 DIAGNOSIS — I51.7 RIGHT VENTRICULAR DILATION: ICD-10-CM

## 2019-11-08 PROCEDURE — A9575 INJ GADOTERATE MEGLUMI 0.1ML: HCPCS | Performed by: INTERNAL MEDICINE

## 2019-11-08 PROCEDURE — 75561 CARDIAC MRI FOR MORPH W/DYE: CPT | Performed by: INTERNAL MEDICINE

## 2019-11-13 ENCOUNTER — TELEPHONE (OUTPATIENT)
Dept: CARDIOLOGY CLINIC | Facility: CLINIC | Age: 29
End: 2019-11-13

## 2019-12-03 ENCOUNTER — APPOINTMENT (OUTPATIENT)
Dept: CARDIOLOGY CLINIC | Facility: CLINIC | Age: 29
End: 2019-12-03
Payer: COMMERCIAL

## 2019-12-03 DIAGNOSIS — I07.1 TRICUSPID VALVE INSUFFICIENCY, UNSPECIFIED ETIOLOGY: ICD-10-CM

## 2019-12-03 DIAGNOSIS — I51.7 RIGHT VENTRICULAR DILATION: ICD-10-CM

## 2019-12-03 DIAGNOSIS — R00.2 PALPITATIONS: ICD-10-CM

## 2019-12-03 DIAGNOSIS — I27.20 PULMONARY HYPERTENSION (HCC): ICD-10-CM

## 2019-12-11 PROCEDURE — 93272 ECG/REVIEW INTERPRET ONLY: CPT | Performed by: INTERNAL MEDICINE

## 2019-12-21 ENCOUNTER — OFFICE VISIT (OUTPATIENT)
Dept: INTERNAL MEDICINE CLINIC | Facility: CLINIC | Age: 29
End: 2019-12-21
Payer: COMMERCIAL

## 2019-12-21 VITALS
OXYGEN SATURATION: 98 % | SYSTOLIC BLOOD PRESSURE: 125 MMHG | HEART RATE: 63 BPM | TEMPERATURE: 98 F | WEIGHT: 174 LBS | DIASTOLIC BLOOD PRESSURE: 71 MMHG | BODY MASS INDEX: 26 KG/M2

## 2019-12-21 DIAGNOSIS — M62.838 MUSCLE SPASM: Primary | ICD-10-CM

## 2019-12-21 PROCEDURE — 99213 OFFICE O/P EST LOW 20 MIN: CPT | Performed by: INTERNAL MEDICINE

## 2019-12-21 NOTE — PROGRESS NOTES
Carito Field is a 30year old male. Patient presents with:  Spasms: twitching    HPI:   21-year-old gentleman here for follow-up.   He had palpitations and was seen by cardiology and subsequently event monitor, echocardiogram, MRI of the heart was do Negative for dizziness, tremors, seizures, syncope, facial asymmetry, speech difficulty, weakness, light-headedness, numbness and headaches. Psychiatric/Behavioral: Negative for behavioral problems.      Wt Readings from Last 5 Encounters:  12/21/19 : 174

## 2020-03-16 ENCOUNTER — TELEPHONE (OUTPATIENT)
Dept: NEUROLOGY | Facility: CLINIC | Age: 30
End: 2020-03-16

## 2020-03-16 NOTE — TELEPHONE ENCOUNTER
Called patient to reschedule appointment for tomorrow related to C19. Patient would like to reschedule for 1 month from now. Routing to [facility] to call patient to reschedule.

## 2020-05-28 ENCOUNTER — HOSPITAL ENCOUNTER (OUTPATIENT)
Age: 30
Discharge: HOME | End: 2020-05-28
Payer: COMMERCIAL

## 2020-05-28 VITALS
WEIGHT: 159 LBS | TEMPERATURE: 98 F | BODY MASS INDEX: 23.55 KG/M2 | RESPIRATION RATE: 18 BRPM | HEIGHT: 69 IN | HEART RATE: 68 BPM | DIASTOLIC BLOOD PRESSURE: 68 MMHG | SYSTOLIC BLOOD PRESSURE: 129 MMHG | OXYGEN SATURATION: 98 %

## 2020-05-28 DIAGNOSIS — T14.8XXA MUSCLE STRAIN: Primary | ICD-10-CM

## 2020-05-28 PROCEDURE — 99214 OFFICE O/P EST MOD 30 MIN: CPT

## 2020-05-28 PROCEDURE — 99213 OFFICE O/P EST LOW 20 MIN: CPT

## 2020-05-28 RX ORDER — METHYLPREDNISOLONE 4 MG/1
TABLET ORAL
Qty: 1 PACKAGE | Refills: 0 | Status: SHIPPED | OUTPATIENT
Start: 2020-05-28 | End: 2020-06-27

## 2020-05-28 NOTE — ED PROVIDER NOTES
Patient presents with:  Groin Pain      HPI:     Mark Rubalcava is a 30year old male who presents today with a chief complaint of pain in the muscle of the left upper thigh that started while he was working today. He works at a car [employer].   He Smoking status: Never Smoker      Smokeless tobacco: Current User    Substance and Sexual Activity      Alcohol use: Yes        Frequency: 2-4 times a month        Comment: [place]      Drug use: No      Sexual activity: Not on file    Lifestyle      Physica skin abnormalities. No abscess formation visible.   HEENT: atraumatic, normocephalic, ears, nose and throat are clear  EYES: sclera non icteric bilateral  NECK: supple, no adenopathy  LUNGS: clear to auscultation, no RRW  CARDIO: RRR without murmur  GI:  s

## 2020-06-25 ENCOUNTER — NURSE TRIAGE (OUTPATIENT)
Dept: INTERNAL MEDICINE CLINIC | Facility: CLINIC | Age: 30
End: 2020-06-25

## 2020-06-25 NOTE — TELEPHONE ENCOUNTER
Action Requested: Summary for Provider     []  Critical Lab, Recommendations Needed  [] Need Additional Advice  []   FYI    []   Need Orders  [] Need Medications Sent to Pharmacy  []  Other     SUMMARY: Patient calling with bilateral knee pain which he has

## 2020-06-27 ENCOUNTER — HOSPITAL ENCOUNTER (OUTPATIENT)
Dept: GENERAL RADIOLOGY | Age: 30
Discharge: HOME | End: 2020-06-27
Attending: INTERNAL MEDICINE
Payer: COMMERCIAL

## 2020-06-27 ENCOUNTER — OFFICE VISIT (OUTPATIENT)
Dept: INTERNAL MEDICINE CLINIC | Facility: CLINIC | Age: 30
End: 2020-06-27
Payer: COMMERCIAL

## 2020-06-27 ENCOUNTER — PATIENT MESSAGE (OUTPATIENT)
Dept: INTERNAL MEDICINE CLINIC | Facility: CLINIC | Age: 30
End: 2020-06-27

## 2020-06-27 VITALS
WEIGHT: 175 LBS | OXYGEN SATURATION: 97 % | DIASTOLIC BLOOD PRESSURE: 64 MMHG | HEIGHT: 68 IN | SYSTOLIC BLOOD PRESSURE: 102 MMHG | TEMPERATURE: 98 F | BODY MASS INDEX: 26.52 KG/M2 | HEART RATE: 72 BPM

## 2020-06-27 DIAGNOSIS — G89.29 CHRONIC PAIN OF BOTH KNEES: ICD-10-CM

## 2020-06-27 DIAGNOSIS — M25.562 CHRONIC PAIN OF BOTH KNEES: ICD-10-CM

## 2020-06-27 DIAGNOSIS — M25.561 CHRONIC PAIN OF BOTH KNEES: ICD-10-CM

## 2020-06-27 DIAGNOSIS — L03.113 CELLULITIS OF RIGHT UPPER EXTREMITY: Primary | ICD-10-CM

## 2020-06-27 DIAGNOSIS — L03.113 CELLULITIS OF RIGHT UPPER EXTREMITY: ICD-10-CM

## 2020-06-27 PROCEDURE — 73560 X-RAY EXAM OF KNEE 1 OR 2: CPT | Performed by: INTERNAL MEDICINE

## 2020-06-27 PROCEDURE — 73080 X-RAY EXAM OF ELBOW: CPT | Performed by: INTERNAL MEDICINE

## 2020-06-27 PROCEDURE — 99214 OFFICE O/P EST MOD 30 MIN: CPT | Performed by: INTERNAL MEDICINE

## 2020-06-27 RX ORDER — MELOXICAM 15 MG/1
15 TABLET ORAL DAILY PRN
Qty: 30 TABLET | Refills: 0 | Status: SHIPPED | OUTPATIENT
Start: 2020-06-27 | End: 2020-07-27

## 2020-06-27 RX ORDER — SULFAMETHOXAZOLE AND TRIMETHOPRIM 800; 160 MG/1; MG/1
1 TABLET ORAL 2 TIMES DAILY
Qty: 14 TABLET | Refills: 0 | Status: SHIPPED | OUTPATIENT
Start: 2020-06-27 | End: 2020-07-04

## 2020-06-27 NOTE — PROGRESS NOTES
Brooke Yip is a 30year old male. Patient presents with:  Elbow Pain: Inflammation and swelling x 2 days; work injury   Knee Pain: Bilateral knee pain; ongoing chronic pain that is worsening    HPI:   56-year-old gentleman here for evaluation of k Negative for congestion and voice change. Respiratory: Negative for cough and shortness of breath. Cardiovascular: Negative for chest pain. Gastrointestinal: Negative for vomiting, abdominal pain and abdominal distention.    Genitourinary: Negative KNEE BILAT EM (CPT=73560); Future  - ORTHOPEDIC - INTERNAL    Plan: As above. I will see him back in couple of months for a physical.  Instructed to go to emergency room in case of worsening symptoms.         The patient indicates understanding of these is

## 2020-06-28 NOTE — TELEPHONE ENCOUNTER
From: [facility]  To: Porsche Leavitt MD  Sent: 6/27/2020 3:21 PM CDT  Subject: Other    Hey doctor I forgot to ask you, is it ok for me to go back to work on Monday with my elbow being swollen?

## 2020-06-29 NOTE — TELEPHONE ENCOUNTER
I have sent a letter in my chart stating that he can return back to work on July 1, 2020.   Please let patient know thank you

## 2020-07-01 ENCOUNTER — OFFICE VISIT (OUTPATIENT)
Dept: INTERNAL MEDICINE CLINIC | Facility: CLINIC | Age: 30
End: 2020-07-01
Payer: COMMERCIAL

## 2020-07-01 VITALS
DIASTOLIC BLOOD PRESSURE: 70 MMHG | HEIGHT: 68 IN | BODY MASS INDEX: 26.98 KG/M2 | HEART RATE: 58 BPM | TEMPERATURE: 98 F | WEIGHT: 178 LBS | SYSTOLIC BLOOD PRESSURE: 126 MMHG

## 2020-07-01 DIAGNOSIS — M62.838 MUSCLE SPASM: Primary | ICD-10-CM

## 2020-07-01 DIAGNOSIS — M54.50 ACUTE RIGHT-SIDED LOW BACK PAIN WITHOUT SCIATICA: ICD-10-CM

## 2020-07-01 PROCEDURE — 99213 OFFICE O/P EST LOW 20 MIN: CPT | Performed by: PHYSICIAN ASSISTANT

## 2020-07-01 RX ORDER — CYCLOBENZAPRINE HCL 10 MG
10 TABLET ORAL 3 TIMES DAILY
Qty: 30 TABLET | Refills: 0 | Status: SHIPPED | OUTPATIENT
Start: 2020-07-01 | End: 2020-07-10

## 2020-07-01 NOTE — PROGRESS NOTES
HPI:    Patient ID: Edwina Doyle is a 30year old male. HPI   Pt presents with low back pain and muscle spasm since Sunday. states happened after a game of softball. Per pt pain is getting better, now instead of both sides its only on the right. Position: Sitting, Cuff Size: large)   Pulse 58   Temp 97.8 °F (36.6 °C) (Temporal)   Ht 5' 8\" (1.727 m)   Wt 178 lb (80.7 kg)   BMI 27.06 kg/m²   Body mass index is 27.06 kg/m². Physical Exam    Constitutional: He is oriented to person, place, and time.

## 2020-07-09 ENCOUNTER — HOSPITAL ENCOUNTER (OUTPATIENT)
Dept: GENERAL RADIOLOGY | Facility: HOSPITAL | Age: 30
Discharge: HOME | End: 2020-07-09
Attending: ORTHOPAEDIC SURGERY
Payer: COMMERCIAL

## 2020-07-09 ENCOUNTER — OFFICE VISIT (OUTPATIENT)
Dept: ORTHOPEDICS CLINIC | Facility: CLINIC | Age: 30
End: 2020-07-09
Payer: COMMERCIAL

## 2020-07-09 DIAGNOSIS — M25.561 PAIN IN BOTH KNEES, UNSPECIFIED CHRONICITY: ICD-10-CM

## 2020-07-09 DIAGNOSIS — M25.562 PAIN IN BOTH KNEES, UNSPECIFIED CHRONICITY: ICD-10-CM

## 2020-07-09 DIAGNOSIS — M22.42 PATELLOFEMORAL CHONDROSIS OF LEFT KNEE: Primary | ICD-10-CM

## 2020-07-09 DIAGNOSIS — M22.41 PATELLOFEMORAL CHONDROSIS OF RIGHT KNEE: ICD-10-CM

## 2020-07-09 PROCEDURE — 99244 OFF/OP CNSLTJ NEW/EST MOD 40: CPT | Performed by: ORTHOPAEDIC SURGERY

## 2020-07-09 PROCEDURE — 73560 X-RAY EXAM OF KNEE 1 OR 2: CPT | Performed by: ORTHOPAEDIC SURGERY

## 2020-07-10 NOTE — PROGRESS NOTES
NURSING INTAKE COMMENTS: Patient presents with:  Consult: referred by Dr Rivera Smoker, per pt xray right and left knee on 6/27/2020 showed fluid in both knees, 5/10 bilateral knee pain, cracking and popping of both knees, pt is taking meloxicam for pain, Mother    • Stroke Maternal Grandfather    • Other (muscular dystrophy) Other        Social History    Occupational History      Not on file    Tobacco Use      Smoking status: Former Smoker        Packs/day: 0.00      Smokeless tobacco: Never Used    [employer] lateral patellar facets. Minimal medial joint line tenderness. Lachman sign and posterior drawer negative bilaterally neurological: Bilateral lower extremities neurologically intact light touch sensory motor strength testing.     Imaging:   Xr Knee (1 Or TISSUES: Localized soft tissue inflammation involving the fat-muscle interface in the proximal right forearm extending into the posterior elbow. EFFUSION: None visible. OTHER: Negative.          CONCLUSION: Nonspecific soft tissue inflammation posterior to PHYSICAL THERAPY - INTERNAL        Assessment: Bilateral knee pain, symptoms suggestive of patellofemoral chondrosis and chondromalacia    Plan: Recommend nonoperative treatment.   Advised outpatient physical therapy, ongoing use of oral anti-inflammatory m

## 2020-07-31 ENCOUNTER — TELEPHONE (OUTPATIENT)
Dept: PHYSICAL THERAPY | Age: 30
End: 2020-07-31

## 2020-08-07 ENCOUNTER — TELEPHONE (OUTPATIENT)
Dept: PHYSICAL THERAPY | Age: 30
End: 2020-08-07

## 2020-08-11 ENCOUNTER — OFFICE VISIT (OUTPATIENT)
Dept: PHYSICAL THERAPY | Age: 30
End: 2020-08-11
Attending: ORTHOPAEDIC SURGERY
Payer: COMMERCIAL

## 2020-08-11 DIAGNOSIS — M22.42 PATELLOFEMORAL CHONDROSIS OF LEFT KNEE: ICD-10-CM

## 2020-08-11 DIAGNOSIS — M22.41 PATELLOFEMORAL CHONDROSIS OF RIGHT KNEE: ICD-10-CM

## 2020-08-11 PROCEDURE — 97110 THERAPEUTIC EXERCISES: CPT

## 2020-08-11 PROCEDURE — 97161 PT EVAL LOW COMPLEX 20 MIN: CPT

## 2020-08-11 NOTE — PROGRESS NOTES
LOWER EXTREMITY EVALUATION:   Referring Physician: Dr. Baires Player DX:  Patellofemoral chondrosis of left knee (M22.42)  Patellofemoral chondrosis of right knee (M22.41)         PATIENT SUMMARY   Clark Soni is a 30year old y/o male who with bilateral knee pain. Patients impairments include decreased lower extremity strength and decreased lumbar range of motion contributing to symptoms.  Patients functional limitations include difficulty with prolonged standing and walking and pain with li comprehensive HEP to maintain progress achieved in PT     Frequency / Duration: Patient will be seen for 2 x/week or a total of 12 visits over a 90 day period. Treatment will include: Manual Therapy; Therapeutic Exercises; Neuromuscular Re-education;  [facility]

## 2020-08-13 ENCOUNTER — OFFICE VISIT (OUTPATIENT)
Dept: PHYSICAL THERAPY | Age: 30
End: 2020-08-13
Attending: ORTHOPAEDIC SURGERY
Payer: COMMERCIAL

## 2020-08-13 DIAGNOSIS — M22.42 PATELLOFEMORAL CHONDROSIS OF LEFT KNEE: ICD-10-CM

## 2020-08-13 DIAGNOSIS — M22.41 PATELLOFEMORAL CHONDROSIS OF RIGHT KNEE: ICD-10-CM

## 2020-08-13 PROCEDURE — 97110 THERAPEUTIC EXERCISES: CPT

## 2020-08-13 NOTE — PROGRESS NOTES
Associated DX:  Patellofemoral chondrosis of left knee (M22.42)  Patellofemoral chondrosis of right knee (M22.41)      Insurance (Authorized # of Visits):  [facility]           Authorizing Physician: Dr. Spencer Shawneeland Next MD visit: none scheduled  Fall Risk: sta

## 2020-08-18 ENCOUNTER — OFFICE VISIT (OUTPATIENT)
Dept: PHYSICAL THERAPY | Age: 30
End: 2020-08-18
Attending: ORTHOPAEDIC SURGERY
Payer: COMMERCIAL

## 2020-08-18 DIAGNOSIS — M22.42 PATELLOFEMORAL CHONDROSIS OF LEFT KNEE: ICD-10-CM

## 2020-08-18 DIAGNOSIS — M22.41 PATELLOFEMORAL CHONDROSIS OF RIGHT KNEE: ICD-10-CM

## 2020-08-18 PROCEDURE — 97110 THERAPEUTIC EXERCISES: CPT

## 2020-08-18 NOTE — PROGRESS NOTES
Associated DX:  Patellofemoral chondrosis of left knee (M22.42)  Patellofemoral chondrosis of right knee (M22.41)      Insurance (Authorized # of Visits):  [facility]           Authorizing Physician: Dr. Sinha Proper Next MD visit: none scheduled  Fall Risk: sta compliant with comprehensive HEP to maintain progress achieved in PT     Plan: Continue with lower extremity strengthening and progress as tolerated.     Charges: 3TE       Total Timed Treatment: 40 min  Total Treatment Time: 40 min

## 2020-08-20 ENCOUNTER — OFFICE VISIT (OUTPATIENT)
Dept: PHYSICAL THERAPY | Age: 30
End: 2020-08-20
Attending: ORTHOPAEDIC SURGERY
Payer: COMMERCIAL

## 2020-08-20 DIAGNOSIS — M22.42 PATELLOFEMORAL CHONDROSIS OF LEFT KNEE: ICD-10-CM

## 2020-08-20 DIAGNOSIS — M22.41 PATELLOFEMORAL CHONDROSIS OF RIGHT KNEE: ICD-10-CM

## 2020-08-20 PROCEDURE — 97110 THERAPEUTIC EXERCISES: CPT

## 2020-08-20 NOTE — PROGRESS NOTES
Associated DX:  Patellofemoral chondrosis of left knee (M22.42)  Patellofemoral chondrosis of right knee (M22.41)      Insurance (Authorized # of Visits):  [facility]           Authorizing Physician: Dr. Mya Kim Next MD visit: none scheduled  Fall Risk: sta Advanced strengthening this visit with increased resistance and challenging dynamic stability and patient tolerated well. Goals:   To be reached in 4 weeks     · Pt will increase hip and knee strength to grossly 4+/5 to be able to get up and down from

## 2020-08-25 ENCOUNTER — OFFICE VISIT (OUTPATIENT)
Dept: PHYSICAL THERAPY | Age: 30
End: 2020-08-25
Attending: ORTHOPAEDIC SURGERY
Payer: COMMERCIAL

## 2020-08-25 DIAGNOSIS — M22.41 PATELLOFEMORAL CHONDROSIS OF RIGHT KNEE: ICD-10-CM

## 2020-08-25 DIAGNOSIS — M22.42 PATELLOFEMORAL CHONDROSIS OF LEFT KNEE: ICD-10-CM

## 2020-08-25 PROCEDURE — 97110 THERAPEUTIC EXERCISES: CPT

## 2020-08-25 NOTE — PROGRESS NOTES
Associated DX:  Patellofemoral chondrosis of left knee (M22.42)  Patellofemoral chondrosis of right knee (M22.41)      Insurance (Authorized # of Visits):  [employer]           Authorizing Physician: Dr. Eda Crump Next MD visit: none scheduled  Fall Risk: sta stretch x 30 seconds B  Standing calf stretch 2 x 30 seconds  Lateral walking with green band 4 x 25 feet  Eccentric heel taps forward and lateral (4inch step) x 20 B  Leg press (7 bands) squats x 30, single leg squats( 6 bands) x 30 heel raises x 30  Squa

## 2020-08-27 ENCOUNTER — OFFICE VISIT (OUTPATIENT)
Dept: PHYSICAL THERAPY | Age: 30
End: 2020-08-27
Attending: ORTHOPAEDIC SURGERY
Payer: COMMERCIAL

## 2020-08-27 DIAGNOSIS — M22.41 PATELLOFEMORAL CHONDROSIS OF RIGHT KNEE: ICD-10-CM

## 2020-08-27 DIAGNOSIS — M22.42 PATELLOFEMORAL CHONDROSIS OF LEFT KNEE: ICD-10-CM

## 2020-08-27 PROCEDURE — 97110 THERAPEUTIC EXERCISES: CPT

## 2020-08-27 NOTE — PROGRESS NOTES
Associated DX:  Patellofemoral chondrosis of left knee (M22.42)  Patellofemoral chondrosis of right knee (M22.41)      Insurance (Authorized # of Visits):  [employer] P[employer]           Authorizing Physician: Dr. Marianela Rosenthal Next MD visit: none scheduled  Fall Risk: sta stretch x 30 seconds B  Standing calf stretch 2 x 30 seconds  Lateral walking with green band 4 x 25 feet  Eccentric heel taps forward and lateral (4inch step) x 20 B  Leg press (7 bands) squats x 30, single leg squats( 6 bands) x 30 heel raises x 30  Squa

## 2020-09-01 ENCOUNTER — OFFICE VISIT (OUTPATIENT)
Dept: PHYSICAL THERAPY | Age: 30
End: 2020-09-01
Attending: ORTHOPAEDIC SURGERY
Payer: COMMERCIAL

## 2020-09-01 DIAGNOSIS — M22.42 PATELLOFEMORAL CHONDROSIS OF LEFT KNEE: ICD-10-CM

## 2020-09-01 DIAGNOSIS — M22.41 PATELLOFEMORAL CHONDROSIS OF RIGHT KNEE: ICD-10-CM

## 2020-09-01 PROCEDURE — 97110 THERAPEUTIC EXERCISES: CPT

## 2020-09-01 NOTE — PROGRESS NOTES
Associated DX:  Patellofemoral chondrosis of left knee (M22.42)  Patellofemoral chondrosis of right knee (M22.41)      Insurance (Authorized # of Visits):  [facility]           Authorizing Physician: Dr. Mya Kim Next MD visit: none scheduled  Fall Risk: sta green band 4 x 25 feet  Eccentric heel taps forward and lateral (4inch step) x 20 B  Leg press (7 bands) squats x 30, single leg squats( 6 bands) x 30 heel raises x 30  Squats on bosu  x 30  Lunge with twist 4 x 20 feet  Step up on bosu/rebounder with twis

## 2020-09-03 ENCOUNTER — OFFICE VISIT (OUTPATIENT)
Dept: PHYSICAL THERAPY | Age: 30
End: 2020-09-03
Attending: ORTHOPAEDIC SURGERY
Payer: COMMERCIAL

## 2020-09-03 DIAGNOSIS — M22.42 PATELLOFEMORAL CHONDROSIS OF LEFT KNEE: ICD-10-CM

## 2020-09-03 DIAGNOSIS — M22.41 PATELLOFEMORAL CHONDROSIS OF RIGHT KNEE: ICD-10-CM

## 2020-09-03 PROCEDURE — 97110 THERAPEUTIC EXERCISES: CPT

## 2020-09-03 NOTE — PROGRESS NOTES
DISCHARGE SUMMARY    Associated DX:  Patellofemoral chondrosis of left knee (M22.42)  Patellofemoral chondrosis of right knee (M22.41)      Insurance (Authorized # of Visits):  [facility]           [address] Manuel Garcia Physician: Dr. Markos Reyes MD visit: none sche taps (4inch step) x 20 B  Leg press (7 bands) squats x 30, single leg squats( 6 bands) x 30 heel raises x 30  Squats on bosu  x 30 TherEx:  LE bike level 8 x 8 minutes  Prone press ups 2 x 10  Prone quad stretch x 30 seconds B  Standing calf stretch 2 x 30 calf stretch 2 x 30 seconds  Lateral walking with green band 4 x 25 feet  Eccentric heel taps forward and lateral (4inch step) x 20 B  Leg press (8 bands) squats x 30, single leg squats( 8 bands) x 30 heel raises x 30  Squats on bosu/ green band/6#  x 30

## 2020-10-05 ENCOUNTER — OFFICE VISIT (OUTPATIENT)
Dept: NEUROLOGY | Facility: CLINIC | Age: 30
End: 2020-10-05
Payer: COMMERCIAL

## 2020-10-05 ENCOUNTER — OFFICE VISIT (OUTPATIENT)
Dept: INTERNAL MEDICINE CLINIC | Facility: CLINIC | Age: 30
End: 2020-10-05
Payer: COMMERCIAL

## 2020-10-05 ENCOUNTER — LAB ENCOUNTER (OUTPATIENT)
Dept: LAB | Age: 30
End: 2020-10-05
Attending: Other
Payer: COMMERCIAL

## 2020-10-05 VITALS
BODY MASS INDEX: 25.92 KG/M2 | HEIGHT: 69 IN | SYSTOLIC BLOOD PRESSURE: 130 MMHG | WEIGHT: 175 LBS | DIASTOLIC BLOOD PRESSURE: 74 MMHG

## 2020-10-05 VITALS
DIASTOLIC BLOOD PRESSURE: 77 MMHG | HEIGHT: 69 IN | BODY MASS INDEX: 25.86 KG/M2 | WEIGHT: 174.63 LBS | OXYGEN SATURATION: 98 % | TEMPERATURE: 97 F | SYSTOLIC BLOOD PRESSURE: 119 MMHG | HEART RATE: 68 BPM | RESPIRATION RATE: 18 BRPM

## 2020-10-05 DIAGNOSIS — L23.2 ALLERGIC CONTACT DERMATITIS DUE TO COSMETICS: Primary | ICD-10-CM

## 2020-10-05 DIAGNOSIS — R20.2 PARESTHESIAS: Primary | ICD-10-CM

## 2020-10-05 DIAGNOSIS — M62.838 SPASM OF MUSCLE: ICD-10-CM

## 2020-10-05 DIAGNOSIS — B00.1 COLD SORE: ICD-10-CM

## 2020-10-05 DIAGNOSIS — R20.2 PARESTHESIAS: ICD-10-CM

## 2020-10-05 PROCEDURE — 36415 COLL VENOUS BLD VENIPUNCTURE: CPT | Performed by: OTHER

## 2020-10-05 PROCEDURE — 3074F SYST BP LT 130 MM HG: CPT | Performed by: INTERNAL MEDICINE

## 2020-10-05 PROCEDURE — 99214 OFFICE O/P EST MOD 30 MIN: CPT | Performed by: INTERNAL MEDICINE

## 2020-10-05 PROCEDURE — 82784 ASSAY IGA/IGD/IGG/IGM EACH: CPT

## 2020-10-05 PROCEDURE — 83516 IMMUNOASSAY NONANTIBODY: CPT

## 2020-10-05 PROCEDURE — 3008F BODY MASS INDEX DOCD: CPT | Performed by: OTHER

## 2020-10-05 PROCEDURE — 3008F BODY MASS INDEX DOCD: CPT | Performed by: INTERNAL MEDICINE

## 2020-10-05 PROCEDURE — 3078F DIAST BP <80 MM HG: CPT | Performed by: INTERNAL MEDICINE

## 2020-10-05 PROCEDURE — 86334 IMMUNOFIX E-PHORESIS SERUM: CPT

## 2020-10-05 PROCEDURE — 82607 VITAMIN B-12: CPT | Performed by: OTHER

## 2020-10-05 PROCEDURE — 86038 ANTINUCLEAR ANTIBODIES: CPT

## 2020-10-05 PROCEDURE — 99204 OFFICE O/P NEW MOD 45 MIN: CPT | Performed by: OTHER

## 2020-10-05 PROCEDURE — 86256 FLUORESCENT ANTIBODY TITER: CPT

## 2020-10-05 PROCEDURE — 85652 RBC SED RATE AUTOMATED: CPT | Performed by: OTHER

## 2020-10-05 PROCEDURE — 3078F DIAST BP <80 MM HG: CPT | Performed by: OTHER

## 2020-10-05 PROCEDURE — 3075F SYST BP GE 130 - 139MM HG: CPT | Performed by: OTHER

## 2020-10-05 PROCEDURE — 84443 ASSAY THYROID STIM HORMONE: CPT

## 2020-10-05 RX ORDER — VALACYCLOVIR HYDROCHLORIDE 1 G/1
TABLET, FILM COATED ORAL
Qty: 4 TABLET | Refills: 1 | Status: SHIPPED | OUTPATIENT
Start: 2020-10-05 | End: 2021-03-20

## 2020-10-05 NOTE — PROGRESS NOTES
Neurology Initial Visit     Referred By: Dr. Abdul ref. provider found    Chief Complaint: Patient presents with:  Neurologic Problem: New patient with c/o of muscle spasms  over entire body. C/o shock feeling  in head for few seconds at a time.  Occurs on d Other          Current Outpatient Medications:   •  Acetaminophen (TYLENOL OR), , Disp: , Rfl:     No Known Allergies    ROS:   As in HPI, the rest of the 14 system review was done and was negative      Physical Exam:   10/05/20  0806   BP: 130/74   Weight TRIG  Lab Results   Component Value Date    HGB 16.0 10/05/2019    HCT 45.6 10/05/2019    MCV 91.0 10/05/2019    WBC 4.5 10/05/2019    .0 10/05/2019      Lab Results   Component Value Date    BUN 16 09/12/2019    CA 9.4 09/12/2019    ALT 11 (L) 02/2

## 2020-10-05 NOTE — PROGRESS NOTES
Todd Fuentes is a 30year old male. Patient presents with:  Rash: Patient states that he use his girlfriend shampoo on his private part since then he notices that his private part got red and notice a rash.  Patient states is getting better but he wo appetite change and fever. HENT: Negative for congestion and voice change. Respiratory: Negative for cough and shortness of breath. Cardiovascular: Negative for chest pain.    Gastrointestinal: Negative for vomiting, abdominal pain and abdominal dis

## 2020-10-06 ENCOUNTER — TELEPHONE (OUTPATIENT)
Dept: NEUROLOGY | Facility: CLINIC | Age: 30
End: 2020-10-06

## 2020-10-06 NOTE — TELEPHONE ENCOUNTER
----- Message from Louann South MD sent at 10/6/2020  1:27 PM CDT -----  Please let the patient know that results of these particular lab tests so far were normal.    Thank you

## 2020-10-06 NOTE — TELEPHONE ENCOUNTER
----- Message from Kirill Zee MD sent at 10/6/2020  1:27 PM CDT -----  Please let the patient know that results of these particular lab tests so far were normal.    Thank you

## 2020-10-12 ENCOUNTER — PATIENT MESSAGE (OUTPATIENT)
Dept: INTERNAL MEDICINE CLINIC | Facility: CLINIC | Age: 30
End: 2020-10-12

## 2020-10-12 NOTE — TELEPHONE ENCOUNTER
Dr Pranav Zhao  See patient message below and advisse  Please reply to pool: EM KIN Bhat      From: Denice Venegas  To: Slime Lamar MD  Sent: 10/12/2020  2:22 PM CDT  Subject: Prescription Question    Hey doctor, my rash has gotten better but it is

## 2020-10-22 ENCOUNTER — TELEPHONE (OUTPATIENT)
Dept: NEUROLOGY | Facility: CLINIC | Age: 30
End: 2020-10-22

## 2020-10-23 ENCOUNTER — TELEPHONE (OUTPATIENT)
Dept: NEUROLOGY | Facility: CLINIC | Age: 30
End: 2020-10-23

## 2020-10-23 DIAGNOSIS — G37.9 DEMYELINATING CHANGES IN BRAIN (HCC): Primary | ICD-10-CM

## 2020-10-23 NOTE — TELEPHONE ENCOUNTER
----- Message from Keyla Elizondo MD sent at 10/23/2020 12:31 PM CDT -----  Please let patient know that MRI brain a few nonspecific spots.   I will suggest to have a next step of lumbar puncture to look for any evidence of multiple sclerosis and foll

## 2020-10-23 NOTE — TELEPHONE ENCOUNTER
Informed patient of imaging results and recommendation's per Dr. Curt Hazel. Patient verbalized understanding and would like to proceed with lumbar puncture.  Informed patient Dr. Curt Hazel will order the LP and the labs and our office will call him once he

## 2020-10-26 NOTE — TELEPHONE ENCOUNTER
Spoke to patient, advised of lumbar puncture order. Procedure explained, will have , will hold motrin.

## 2020-10-31 ENCOUNTER — LAB ENCOUNTER (OUTPATIENT)
Dept: LAB | Facility: HOSPITAL | Age: 30
End: 2020-10-31
Attending: Other
Payer: COMMERCIAL

## 2020-10-31 DIAGNOSIS — G37.9 DEMYELINATING CHANGES IN BRAIN (HCC): ICD-10-CM

## 2020-11-02 ENCOUNTER — PATIENT MESSAGE (OUTPATIENT)
Dept: NEUROLOGY | Facility: CLINIC | Age: 30
End: 2020-11-02

## 2020-11-02 NOTE — TELEPHONE ENCOUNTER
Spoke to patient. He states that he saw on mychart that his covid test was positive. He states that he has been having body aches, loss of taste and smell and was running a fever. Explained that I called scheduling and cancelled LP for tomorrow.  He will ne

## 2020-11-02 NOTE — TELEPHONE ENCOUNTER
From: [facility]  To: Lisa Bustillos MD  Sent: 11/2/2020 3:11 PM CST  Subject: Test Results Question    Hey doctor, I just tested positive for covid-19 and I got a couple questions.  I have the procedure scheduled for tomorrow, should I resc

## 2020-11-02 NOTE — TELEPHONE ENCOUNTER
Called scheduled department and cancelled LP scheduled for tomorrow. Message sent to patient informing him that     Tried calling patient x 2. No answer and VM is full.

## 2020-11-03 NOTE — TELEPHONE ENCOUNTER
While I can sign the letter, I would definitely suggest for him to get a hold of his primary care physician for further recommendations

## 2020-11-03 NOTE — TELEPHONE ENCOUNTER
Spoke to patient and notified him of below. He was understanding. Explained that he should reach out to PCP. HE was understanding and thankful for the call.

## 2020-11-10 ENCOUNTER — TELEMEDICINE (OUTPATIENT)
Dept: INTERNAL MEDICINE CLINIC | Facility: CLINIC | Age: 30
End: 2020-11-10
Payer: COMMERCIAL

## 2020-11-10 ENCOUNTER — TELEPHONE (OUTPATIENT)
Dept: INTERNAL MEDICINE CLINIC | Facility: CLINIC | Age: 30
End: 2020-11-10

## 2020-11-10 DIAGNOSIS — R05.9 COUGH: Primary | ICD-10-CM

## 2020-11-10 DIAGNOSIS — R06.02 SOB (SHORTNESS OF BREATH): ICD-10-CM

## 2020-11-10 DIAGNOSIS — U07.1 COVID-19 VIRUS INFECTION: ICD-10-CM

## 2020-11-10 PROCEDURE — 99213 OFFICE O/P EST LOW 20 MIN: CPT | Performed by: INTERNAL MEDICINE

## 2020-11-10 NOTE — TELEPHONE ENCOUNTER
Pt states is covid positive and symptoms are not improving. Per chart pt was notified by neurology that he was covid positive. Pt states has mild SOB, cough \"my chest feels tingly\"    Pt denies difficulty breathing.     Pt asking for repeat covid

## 2020-11-11 NOTE — PROGRESS NOTES
HPI:    Patient ID: Denice Barragan is a 30year old male. HPI  Seen today through life to review visit.   He was diagnosed with Covid  October 31 he says overall he is feeling much better but the sense of smell is coming back the body aches is bett above monitor symptoms after Friday he should be okay and quarantine will be cleared for work back on Monday  18 minutes spent  No orders of the defined types were placed in this encounter.       Meds This Visit:  Requested Prescriptions      No prescriptio

## 2020-11-16 ENCOUNTER — TELEPHONE (OUTPATIENT)
Dept: INTERNAL MEDICINE CLINIC | Facility: CLINIC | Age: 30
End: 2020-11-16

## 2020-11-16 DIAGNOSIS — U07.1 COVID-19: Primary | ICD-10-CM

## 2020-11-16 NOTE — TELEPHONE ENCOUNTER
----- Message from [address] [place] [address]. Ixta sent at 11/16/2020 11:28 AM CST -----  Regarding: Non-Urgent Medical Question  Contact: [phone]  Najma doctor, so I was tested for covid-19 and came back positive on the 31 of October.  I have been qurentining since the

## 2020-11-17 NOTE — TELEPHONE ENCOUNTER
I did not talk to him. I have sent him a [employer] message yesterday. I tried calling him today. It went to voicemail. His voicemail was full. I have ordered for COVID-19 testing for him.   If he wants to do it here he can do it here if he wants to d

## 2020-11-17 NOTE — TELEPHONE ENCOUNTER
Dr Artist Sacks, please advise. I pended test-he said you had said may not be able to get this with us? Advised patient on Dr. Rosario Read information and recommendations. Patient verbalized understanding.  He just talked to his work, his employer told him he m

## 2020-11-18 ENCOUNTER — MED REC SCAN ONLY (OUTPATIENT)
Dept: INTERNAL MEDICINE CLINIC | Facility: CLINIC | Age: 30
End: 2020-11-18

## 2020-11-18 NOTE — TELEPHONE ENCOUNTER
Called patient. Mailbox full. Cannot leave message. Will send no response letter.   Routed as Alejandra Sharma

## 2020-11-18 NOTE — TELEPHONE ENCOUNTER
Informed patient of Covid test ordered per Dr. Syed Wing.  Patient states he's already scheduled to have this done today.

## 2020-11-19 ENCOUNTER — TELEPHONE (OUTPATIENT)
Dept: ADMINISTRATIVE | Age: 30
End: 2020-11-19

## 2020-11-19 NOTE — TELEPHONE ENCOUNTER
[facility] Disab forms rec'd and [facility] sent for HIPAA and fee.

## 2020-11-23 ENCOUNTER — APPOINTMENT (OUTPATIENT)
Dept: LAB | Age: 30
End: 2020-11-23
Attending: INTERNAL MEDICINE
Payer: COMMERCIAL

## 2020-11-23 DIAGNOSIS — U07.1 COVID-19: ICD-10-CM

## 2020-11-27 ENCOUNTER — PATIENT MESSAGE (OUTPATIENT)
Dept: INTERNAL MEDICINE CLINIC | Facility: CLINIC | Age: 30
End: 2020-11-27

## 2020-11-27 NOTE — TELEPHONE ENCOUNTER
From: [facility]  To: Zelalem Trinh MD  Sent: 11/27/2020 11:47 AM CST  Subject: Non-Urgent Medical Question    Hey doctor, I received your voicemail saying to go back to work on Monday.  Could you write me a letter saying to go back to work on M

## 2020-11-27 NOTE — TELEPHONE ENCOUNTER
Doctor, please see pt message below and advise. Noted pt has a Covid-19 test in process from 11/23/2020.

## 2020-11-28 NOTE — TELEPHONE ENCOUNTER
His repeat Covid test is negative. I have sent a [employer] message. I have sent him a result note as well.   Please have patient know thank you

## 2020-11-30 NOTE — TELEPHONE ENCOUNTER
FYI    Viewed by Brooke Yip on 11/28/2020  4:43 PM  Written by Wilber Sheth MD on 11/28/2020 11:51 AM

## 2020-12-02 ENCOUNTER — TELEPHONE (OUTPATIENT)
Dept: INTERNAL MEDICINE CLINIC | Facility: CLINIC | Age: 30
End: 2020-12-02

## 2020-12-02 ENCOUNTER — PATIENT MESSAGE (OUTPATIENT)
Dept: INTERNAL MEDICINE CLINIC | Facility: CLINIC | Age: 30
End: 2020-12-02

## 2020-12-02 NOTE — TELEPHONE ENCOUNTER
From: JU[facility]ON [facility]  Sent: 12/2/2020 2:37 PM CST  To: Em Rn Triage  Subject: RE: Non-Urgent Medical Question    Hi, I can't seem to add the attachment on here, could I please get the fax number, so I could fax over paperwork?  Thank you    ----- Mess

## 2020-12-07 NOTE — TELEPHONE ENCOUNTER
Dr. Larry Meneses,     Please sign off on form:  -Highlight the patient and hit \"Chart\" button.   -In Chart Review, w/in the Encounter tab - click 1 time on the Telephone call encounter for 11/19/20 Scroll down the telephone encounter.  -Click \"scan on\" blue

## 2020-12-10 NOTE — TELEPHONE ENCOUNTER
Dr. José Mccormick.     Please sign off on form:  -Highlight the patient and hit \"Chart\" button.   -In Chart Review, w/in the Encounter tab - click 1 time on the Telephone call encounter for 11/19/20 Scroll down the telephone encounter.  -Click \"scan on\" blue

## 2021-03-05 ENCOUNTER — ORDER TRANSCRIPTION (OUTPATIENT)
Dept: ADMINISTRATIVE | Facility: HOSPITAL | Age: 31
End: 2021-03-05

## 2021-03-05 DIAGNOSIS — Z01.818 PREOP EXAMINATION: Primary | ICD-10-CM

## 2021-03-18 ENCOUNTER — NURSE TRIAGE (OUTPATIENT)
Dept: INTERNAL MEDICINE CLINIC | Facility: CLINIC | Age: 31
End: 2021-03-18

## 2021-03-18 NOTE — TELEPHONE ENCOUNTER
Action Requested: Summary for Provider     []  Critical Lab, Recommendations Needed  [] Need Additional Advice  []   FYI    []   Need Orders  [] Need Medications Sent to Pharmacy  []  Other     SUMMARY: pt made a physical appt for Saturday.   States that he

## 2021-03-20 ENCOUNTER — OFFICE VISIT (OUTPATIENT)
Dept: INTERNAL MEDICINE CLINIC | Facility: CLINIC | Age: 31
End: 2021-03-20
Payer: COMMERCIAL

## 2021-03-20 VITALS
OXYGEN SATURATION: 98 % | BODY MASS INDEX: 25.48 KG/M2 | SYSTOLIC BLOOD PRESSURE: 120 MMHG | WEIGHT: 172 LBS | HEIGHT: 69 IN | HEART RATE: 62 BPM | DIASTOLIC BLOOD PRESSURE: 70 MMHG | RESPIRATION RATE: 14 BRPM

## 2021-03-20 DIAGNOSIS — Z00.00 ADULT GENERAL MEDICAL EXAM: Primary | ICD-10-CM

## 2021-03-20 LAB
ALBUMIN SERPL-MCNC: 4.4 G/DL (ref 3.4–5)
ALBUMIN/GLOB SERPL: 1.4 {RATIO} (ref 1–2)
ALP LIVER SERPL-CCNC: 46 U/L
ALT SERPL-CCNC: 49 U/L
ANION GAP SERPL CALC-SCNC: 5 MMOL/L (ref 0–18)
AST SERPL-CCNC: 19 U/L (ref 15–37)
BILIRUB SERPL-MCNC: 2 MG/DL (ref 0.1–2)
BUN BLD-MCNC: 12 MG/DL (ref 7–18)
BUN/CREAT SERPL: 11.7 (ref 10–20)
CALCIUM BLD-MCNC: 9.1 MG/DL (ref 8.5–10.1)
CHLORIDE SERPL-SCNC: 105 MMOL/L (ref 98–112)
CHOLEST SMN-MCNC: 136 MG/DL (ref ?–200)
CO2 SERPL-SCNC: 28 MMOL/L (ref 21–32)
CREAT BLD-MCNC: 1.03 MG/DL
DEPRECATED RDW RBC AUTO: 38.2 FL (ref 35.1–46.3)
ERYTHROCYTE [DISTWIDTH] IN BLOOD BY AUTOMATED COUNT: 11.4 % (ref 11–15)
EST. AVERAGE GLUCOSE BLD GHB EST-MCNC: 97 MG/DL (ref 68–126)
GLOBULIN PLAS-MCNC: 3.2 G/DL (ref 2.8–4.4)
GLUCOSE BLD-MCNC: 90 MG/DL (ref 70–99)
HBA1C MFR BLD HPLC: 5 % (ref ?–5.7)
HCT VFR BLD AUTO: 46.6 %
HDLC SERPL-MCNC: 41 MG/DL (ref 40–59)
HGB BLD-MCNC: 16.1 G/DL
LDLC SERPL CALC-MCNC: 78 MG/DL (ref ?–100)
M PROTEIN MFR SERPL ELPH: 7.6 G/DL (ref 6.4–8.2)
MCH RBC QN AUTO: 31.4 PG (ref 26–34)
MCHC RBC AUTO-ENTMCNC: 34.5 G/DL (ref 31–37)
MCV RBC AUTO: 91 FL
NONHDLC SERPL-MCNC: 95 MG/DL (ref ?–130)
OSMOLALITY SERPL CALC.SUM OF ELEC: 285 MOSM/KG (ref 275–295)
PATIENT FASTING Y/N/NP: YES
PATIENT FASTING Y/N/NP: YES
PLATELET # BLD AUTO: 231 10(3)UL (ref 150–450)
POTASSIUM SERPL-SCNC: 4.2 MMOL/L (ref 3.5–5.1)
RBC # BLD AUTO: 5.12 X10(6)UL
SODIUM SERPL-SCNC: 138 MMOL/L (ref 136–145)
TRIGL SERPL-MCNC: 84 MG/DL (ref 30–149)
TSI SER-ACNC: 0.68 MIU/ML (ref 0.36–3.74)
VLDLC SERPL CALC-MCNC: 17 MG/DL (ref 0–30)
WBC # BLD AUTO: 5.1 X10(3) UL (ref 4–11)

## 2021-03-20 PROCEDURE — 36415 COLL VENOUS BLD VENIPUNCTURE: CPT | Performed by: INTERNAL MEDICINE

## 2021-03-20 PROCEDURE — 3008F BODY MASS INDEX DOCD: CPT | Performed by: INTERNAL MEDICINE

## 2021-03-20 PROCEDURE — 99395 PREV VISIT EST AGE 18-39: CPT | Performed by: INTERNAL MEDICINE

## 2021-03-20 PROCEDURE — 3078F DIAST BP <80 MM HG: CPT | Performed by: INTERNAL MEDICINE

## 2021-03-20 PROCEDURE — 3074F SYST BP LT 130 MM HG: CPT | Performed by: INTERNAL MEDICINE

## 2021-03-20 RX ORDER — PANTOPRAZOLE SODIUM 40 MG/1
40 TABLET, DELAYED RELEASE ORAL
Qty: 30 TABLET | Refills: 0 | Status: SHIPPED | OUTPATIENT
Start: 2021-03-20 | End: 2021-04-19

## 2021-03-20 NOTE — PROGRESS NOTES
Sara Brewster is a 35year old male. Patient presents with:  Physical    HPI:   28-year-old gentleman with a past medical history of paresthesia currently undergoing work-up for multiple sclerosis here for physical.  He does have occasional paresthes swelling. Gastrointestinal: Negative for abdominal distention, abdominal pain, blood in stool, constipation, diarrhea and vomiting. Endocrine: Negative for cold intolerance and heat intolerance.    Genitourinary: Negative for difficulty urinating, dysur tenderness. Right lower leg: No edema. Left lower leg: No edema. Lymphadenopathy:      Cervical: No cervical adenopathy. Skin:     General: Skin is warm and dry.    Neurological:      Mental Status: He is alert and oriented to person, place, a everything is good, I will see him back in 6 months      The patient indicates understanding of these issues and agrees to the plan. No follow-ups on file.

## 2021-03-22 LAB
C TRACH DNA SPEC QL NAA+PROBE: NEGATIVE
N GONORRHOEA DNA SPEC QL NAA+PROBE: NEGATIVE

## 2021-03-27 ENCOUNTER — HOSPITAL ENCOUNTER (OUTPATIENT)
Dept: GENERAL RADIOLOGY | Facility: HOSPITAL | Age: 31
Discharge: HOME | End: 2021-03-27
Attending: ORTHOPAEDIC SURGERY
Payer: COMMERCIAL

## 2021-03-27 DIAGNOSIS — M25.562 PAIN IN BOTH KNEES, UNSPECIFIED CHRONICITY: ICD-10-CM

## 2021-03-27 DIAGNOSIS — M25.561 PAIN IN BOTH KNEES, UNSPECIFIED CHRONICITY: ICD-10-CM

## 2021-03-30 ENCOUNTER — TELEPHONE (OUTPATIENT)
Dept: INTERNAL MEDICINE CLINIC | Facility: CLINIC | Age: 31
End: 2021-03-30

## 2021-03-30 NOTE — TELEPHONE ENCOUNTER
Pt informed STD's ar negative and for patient to eat healthy and exercise 30-to 40 minutes 3 to 4 times a week as tolerated.

## 2021-04-05 ENCOUNTER — LAB ENCOUNTER (OUTPATIENT)
Dept: LAB | Facility: HOSPITAL | Age: 31
End: 2021-04-05
Attending: Other
Payer: COMMERCIAL

## 2021-04-05 DIAGNOSIS — Z01.818 PREOP EXAMINATION: ICD-10-CM

## 2021-04-08 ENCOUNTER — HOSPITAL ENCOUNTER (OUTPATIENT)
Dept: GENERAL RADIOLOGY | Facility: HOSPITAL | Age: 31
Discharge: HOME | End: 2021-04-08
Attending: Other
Payer: COMMERCIAL

## 2021-04-08 VITALS
HEIGHT: 69 IN | WEIGHT: 175 LBS | SYSTOLIC BLOOD PRESSURE: 117 MMHG | HEART RATE: 58 BPM | OXYGEN SATURATION: 97 % | DIASTOLIC BLOOD PRESSURE: 73 MMHG | RESPIRATION RATE: 16 BRPM | BODY MASS INDEX: 25.92 KG/M2

## 2021-04-08 DIAGNOSIS — G37.9 DEMYELINATING CHANGES IN BRAIN (HCC): ICD-10-CM

## 2021-04-08 PROCEDURE — 89050 BODY FLUID CELL COUNT: CPT

## 2021-04-08 PROCEDURE — 62328 DX LMBR SPI PNXR W/FLUOR/CT: CPT | Performed by: OTHER

## 2021-04-08 PROCEDURE — 82945 GLUCOSE OTHER FLUID: CPT

## 2021-04-08 PROCEDURE — 83916 OLIGOCLONAL BANDS: CPT

## 2021-04-08 PROCEDURE — 82040 ASSAY OF SERUM ALBUMIN: CPT

## 2021-04-08 PROCEDURE — 82042 OTHER SOURCE ALBUMIN QUAN EA: CPT

## 2021-04-08 PROCEDURE — 82784 ASSAY IGA/IGD/IGG/IGM EACH: CPT

## 2021-04-08 NOTE — IMAGING NOTE
1108RECEIVED PATIENT IN DI PRE LP. REVIEWED PROCEDURE AND QUESTIONS ANSWERED. PROCEDUREE EXPLAINED. MEDS REVIEWED.  READY FOR PROCEDURE.    1205 RECEIVED PATIENT POST LP IN RAD HOLDING, NO C/O DISCOMFORT. VS TAKEN- SEE FLOW SHEET    124 AVS SHEET PROVIDED

## 2021-04-12 ENCOUNTER — PATIENT MESSAGE (OUTPATIENT)
Dept: NEUROLOGY | Facility: CLINIC | Age: 31
End: 2021-04-12

## 2021-04-14 ENCOUNTER — OFFICE VISIT (OUTPATIENT)
Dept: ORTHOPEDICS CLINIC | Facility: CLINIC | Age: 31
End: 2021-04-14
Payer: COMMERCIAL

## 2021-04-14 ENCOUNTER — PATIENT MESSAGE (OUTPATIENT)
Dept: NEUROLOGY | Facility: CLINIC | Age: 31
End: 2021-04-14

## 2021-04-14 ENCOUNTER — TELEMEDICINE (OUTPATIENT)
Dept: NEUROLOGY | Facility: CLINIC | Age: 31
End: 2021-04-14

## 2021-04-14 DIAGNOSIS — R20.2 PARESTHESIAS: Primary | ICD-10-CM

## 2021-04-14 DIAGNOSIS — M22.41 PATELLOFEMORAL CHONDROSIS OF RIGHT KNEE: Primary | ICD-10-CM

## 2021-04-14 DIAGNOSIS — M22.42 PATELLOFEMORAL CHONDROSIS OF LEFT KNEE: ICD-10-CM

## 2021-04-14 PROCEDURE — 99213 OFFICE O/P EST LOW 20 MIN: CPT | Performed by: ORTHOPAEDIC SURGERY

## 2021-04-14 NOTE — PROGRESS NOTES
NURSING INTAKE COMMENTS: Patient presents with:  Knee Pain: Right f/u - he finnished the PT as directed but he still has pain in both knees but the R is worse than the L - rates pain as 7-8/10 on and off in the R knee - no new injury       HPI: This 30 yea Comment: marijuana in past      Sexual activity: Not on file       Review of Systems:  GENERAL: denies fevers, chills, night sweats, fatigue, unintentional weight loss/gain  SKIN: denies skin lesions, open sores, rash  HEENT:denies recent vision change, n thrombosis. Neurological: Normal    Imaging:   XR LUMBAR PUNCTURE Duglas MAHANG (VGB=51010)    Result Date: 4/8/2021  PROCEDURE: XR LUMBAR PUNCTURE (CPT=62270,05433)  COMPARISON: None. 1  INDICATIONS: Demyelinating changes in brain.   TECHNIQUE: The aaron and performed all medical decision making.

## 2021-04-14 NOTE — TELEPHONE ENCOUNTER
From: [facility]  To: Sierra Scott MD  Sent: 4/14/2021 2:16 PM CDT  Subject: Test Results Question    Hey doctor could you write me a letter for work so I could take the rest of this week off due to my headache.  Hopefully by next Monday I

## 2021-04-14 NOTE — TELEPHONE ENCOUNTER
From: [facility]  To: Lisa Bustillos MD  Sent: 4/12/2021 10:35 PM CDT  Subject: Test Results Question    Hey doctor, have you had a chance to look at my test results from my lombar puncture?
Left detailed message for patient per Dr. Carver Cassette to increase caffeine intake to at least 5 cups of coffee per day and stay hydrated. Advise him to call the office if he continues to have headaches or they worsen.   Asked him to call with update on Frida
Please let him know that no oligoclonal bands were seen. Typically we see oligoclonal bands in multiple sclerosis.
Spoke to patient and informed him of results as noted by Dr. Prabhu Major below. Informed him that no oligoclonal bands were seen in his labs and typically these would be present in MS. He was understanding of this and thankful for the information.     He had
Spoke to patient and notified him of below. He was understanding.   Set him up for telemedicine appointment with Dr. Tuttle Notice tomorrow, 4/14/21 at 12 pm.
no

## 2021-04-18 ENCOUNTER — IMMUNIZATION (OUTPATIENT)
Dept: LAB | Age: 31
End: 2021-04-18
Attending: HOSPITALIST
Payer: COMMERCIAL

## 2021-04-18 DIAGNOSIS — Z23 NEED FOR VACCINATION: Primary | ICD-10-CM

## 2021-04-18 PROCEDURE — 0001A SARSCOV2 VAC 30MCG/0.3ML IM: CPT

## 2021-04-19 ENCOUNTER — PATIENT MESSAGE (OUTPATIENT)
Dept: INTERNAL MEDICINE CLINIC | Facility: CLINIC | Age: 31
End: 2021-04-19

## 2021-04-19 ENCOUNTER — HOSPITAL ENCOUNTER (OUTPATIENT)
Age: 31
Discharge: HOME | End: 2021-04-19
Payer: COMMERCIAL

## 2021-04-19 VITALS
DIASTOLIC BLOOD PRESSURE: 88 MMHG | SYSTOLIC BLOOD PRESSURE: 134 MMHG | OXYGEN SATURATION: 100 % | TEMPERATURE: 98 F | RESPIRATION RATE: 18 BRPM | HEART RATE: 70 BPM

## 2021-04-19 DIAGNOSIS — K21.9 GASTROESOPHAGEAL REFLUX DISEASE, UNSPECIFIED WHETHER ESOPHAGITIS PRESENT: Primary | ICD-10-CM

## 2021-04-19 DIAGNOSIS — K29.00 ACUTE GASTRITIS WITHOUT HEMORRHAGE, UNSPECIFIED GASTRITIS TYPE: ICD-10-CM

## 2021-04-19 PROCEDURE — 99213 OFFICE O/P EST LOW 20 MIN: CPT | Performed by: EMERGENCY MEDICINE

## 2021-04-19 PROCEDURE — 87880 STREP A ASSAY W/OPTIC: CPT | Performed by: EMERGENCY MEDICINE

## 2021-04-19 RX ORDER — LIDOCAINE HYDROCHLORIDE 20 MG/ML
5 SOLUTION OROPHARYNGEAL ONCE
Status: COMPLETED | OUTPATIENT
Start: 2021-04-19 | End: 2021-04-19

## 2021-04-19 RX ORDER — MAGNESIUM HYDROXIDE/ALUMINUM HYDROXICE/SIMETHICONE 120; 1200; 1200 MG/30ML; MG/30ML; MG/30ML
30 SUSPENSION ORAL ONCE
Status: COMPLETED | OUTPATIENT
Start: 2021-04-19 | End: 2021-04-19

## 2021-04-19 RX ORDER — DICYCLOMINE HCL 20 MG
20 TABLET ORAL 4 TIMES DAILY PRN
Qty: 20 TABLET | Refills: 0 | Status: SHIPPED | OUTPATIENT
Start: 2021-04-19

## 2021-04-19 RX ORDER — METOCLOPRAMIDE 10 MG/1
10 TABLET ORAL 3 TIMES DAILY PRN
Qty: 14 TABLET | Refills: 0 | Status: SHIPPED | OUTPATIENT
Start: 2021-04-19

## 2021-04-19 RX ORDER — DICYCLOMINE HYDROCHLORIDE 10 MG/5ML
20 SOLUTION ORAL ONCE
Status: COMPLETED | OUTPATIENT
Start: 2021-04-19 | End: 2021-04-19

## 2021-04-19 RX ORDER — DICYCLOMINE HYDROCHLORIDE 10 MG/5ML
10 SOLUTION ORAL 4 TIMES DAILY PRN
Qty: 120 ML | Refills: 0 | Status: SHIPPED | OUTPATIENT
Start: 2021-04-19 | End: 2021-04-19

## 2021-04-19 NOTE — TELEPHONE ENCOUNTER
From: [facility]  To: Kaitlin Pringle MD  Sent: 4/19/2021 9:15 AM CDT  Subject: Test Results Question    Hey doctor, I received my covid vaccine yesterday and today I started to have some symptoms. I had the chills, diarrhea and a headache.  Could

## 2021-04-19 NOTE — ED INITIAL ASSESSMENT (HPI)
Pt here with complaints of epigastric pain and throat discomfort for about 1 month , pt states he feel like its hard to swallow and feels like his food doesn't go all the way down, pt states he had 1 episode of diarrhea yesterday and vomited x1 today, pt d

## 2021-04-20 NOTE — TELEPHONE ENCOUNTER
Patient was evaluated in urgent care yesterday.      Disposition and Plan   Clinical Impression:  Gastroesophageal reflux disease, unspecified whether esophagitis present  (primary encounter diagnosis)  Acute gastritis without hemorrhage, unspecified Ruthie Pipe

## 2021-05-08 ENCOUNTER — HOSPITAL ENCOUNTER (OUTPATIENT)
Dept: MRI IMAGING | Facility: HOSPITAL | Age: 31
Discharge: HOME | End: 2021-05-08
Attending: PHYSICIAN ASSISTANT
Payer: COMMERCIAL

## 2021-05-08 DIAGNOSIS — M22.41 PATELLOFEMORAL CHONDROSIS OF RIGHT KNEE: ICD-10-CM

## 2021-05-08 PROCEDURE — 73721 MRI JNT OF LWR EXTRE W/O DYE: CPT | Performed by: PHYSICIAN ASSISTANT

## 2021-05-09 ENCOUNTER — IMMUNIZATION (OUTPATIENT)
Dept: LAB | Age: 31
End: 2021-05-09
Attending: HOSPITALIST
Payer: COMMERCIAL

## 2021-05-09 DIAGNOSIS — Z23 NEED FOR VACCINATION: Primary | ICD-10-CM

## 2021-05-09 PROCEDURE — 0002A SARSCOV2 VAC 30MCG/0.3ML IM: CPT

## 2022-11-14 NOTE — TELEPHONE ENCOUNTER
Dear Renata Bosch,     I have reviewed your STD screening for chlamydia and gonorrhea.  They both are negative     I encourage you to eat healthy, exercise for 30 to 40 minutes 3-4 times a week or as tolerated.  If you have any questions, please contact our off Subjective:       Patient ID: Cara Sharp is a 67 y.o. female.    Chief Complaint: No chief complaint on file.    HPI 67-year-old female seen in Oncology Clinic for follow-up of a diagnosis of left breast cancer.She is on adjuvant letrozole.  She is a patient of .    She is tolerating letrozole very well.  Her hot flashes have mostly resolved.  She does have some chronic numbness and stiffness in her left axilla and shoulder.  She has not had any physical therapy.    She is planning to start going to the gym to do some exercise.      She has chronic insomnia which started prior to her surgery.    She has not had any follow-up for her findings of a hepatic density and pulmonary nodule seen on pre-surgical CT scans.        Breast cancer history:  Mammogram on October 12, 2020 demonstrated an irregular mass in the upper outer quadrant left breast measuring 1.4 cm. Ultrasound showed a 1.2 cm hypoechoic mass.    On October 25, 2021 biopsy was performed which showed infiltrating ductal carcinoma with lobular features with some associated intermediate grade DCIS (histologic grade 3, nuclear grade 2, mitotic index 1).  Tumor cells were 96% ER positive, 95% ID positive, HER2 was 1+ and Ki-67 was 9%.  Largest focus was 10 mm.    Pre surgical CT of the abdomen and pelvis on 2/3/21  showed a 9 mm left hepatic density and a 1 cm LLL pulmonary nodule.    On 2/7/22 she underwent bilateral nipple sparing mastectomy with autologous reconstruction - KOKO flaps  ([facility] - Dr. Butcher)  The pathology from that surgery showed 2 mm of residual invasive cancer in the left breast with 3 negative sentinel lymph nodes.T1bNo.  The right breast showed no significant abnormality.    Oncotype score was 6.    Letrozole started March 2023.    BMD normal in March 2022.     Review of Systems   Constitutional:  Negative for activity change, appetite change, fever and unexpected weight change.   HENT:  Positive for hearing loss.  Negative for mouth sores.    Eyes:  Negative for visual disturbance.   Respiratory:  Negative for cough and shortness of breath.    Cardiovascular:  Negative for chest pain.   Gastrointestinal:  Negative for abdominal pain and diarrhea.   Genitourinary: Negative.  Negative for frequency.   Musculoskeletal:  Negative for back pain.   Integumentary:  Negative for rash.   Neurological:  Negative for headaches.   Hematological:  Negative for adenopathy.   Psychiatric/Behavioral:  The patient is not nervous/anxious.        Objective:      Physical Exam  Vitals reviewed.   Constitutional:       General: She is not in acute distress.  HENT:      Head: Normocephalic.      Mouth/Throat:      Mouth: Mucous membranes are moist.      Pharynx: Oropharynx is clear. No oropharyngeal exudate or posterior oropharyngeal erythema.   Eyes:      General: No scleral icterus.  Cardiovascular:      Rate and Rhythm: Normal rate and regular rhythm.   Pulmonary:      Effort: Pulmonary effort is normal. No respiratory distress.      Breath sounds: Normal breath sounds. No wheezing or rales.   Chest:       Abdominal:      Palpations: Abdomen is soft. There is no mass.      Tenderness: There is no abdominal tenderness.   Lymphadenopathy:      Cervical: No cervical adenopathy.      Upper Body:      Right upper body: No supraclavicular or axillary adenopathy.      Left upper body: No supraclavicular or axillary adenopathy.   Skin:     Findings: No rash.   Neurological:      Mental Status: She is alert and oriented to person, place, and time.   Psychiatric:         Mood and Affect: Mood normal.         Behavior: Behavior normal.         Thought Content: Thought content normal.         Judgment: Judgment normal.       Assessment:       Problem List Items Addressed This Visit       Malignant neoplasm of upper-outer quadrant of left breast in female, estrogen receptor positive - Primary       Plan:       RTC 3 M  CT scan of chest and abdomen.    Trial  of trazodone for insomnia.    Referral to physical therapy.    I encouraged her to start her exercise program.           Route Chart for Scheduling    Med Onc Chart Routing      Follow up with physician    Follow up with [facility] 3 months.   Infusion scheduling note    Injection scheduling note    Labs    Imaging CT chest abdomen pelvis   next avail   Pharmacy appointment    Other referrals Additional referrals needed

## 2023-05-07 ENCOUNTER — HOSPITAL ENCOUNTER (EMERGENCY)
Facility: HOSPITAL | Age: 33
Discharge: HOME | End: 2023-05-07
Attending: EMERGENCY MEDICINE
Payer: COMMERCIAL

## 2023-05-07 VITALS
BODY MASS INDEX: 26.66 KG/M2 | SYSTOLIC BLOOD PRESSURE: 151 MMHG | OXYGEN SATURATION: 98 % | HEIGHT: 69 IN | TEMPERATURE: 99 F | DIASTOLIC BLOOD PRESSURE: 74 MMHG | RESPIRATION RATE: 16 BRPM | HEART RATE: 58 BPM | WEIGHT: 180 LBS

## 2023-05-07 DIAGNOSIS — J02.9 VIRAL PHARYNGITIS: Primary | ICD-10-CM

## 2023-05-07 LAB
S PYO AG THROAT QL: NEGATIVE
SARS-COV-2 RNA RESP QL NAA+PROBE: NOT DETECTED

## 2023-05-07 PROCEDURE — 99283 EMERGENCY DEPT VISIT LOW MDM: CPT

## 2023-05-07 PROCEDURE — 87880 STREP A ASSAY W/OPTIC: CPT

## 2023-05-07 RX ORDER — IBUPROFEN 600 MG/1
600 TABLET ORAL EVERY 8 HOURS PRN
Qty: 30 TABLET | Refills: 0 | Status: SHIPPED | OUTPATIENT
Start: 2023-05-07 | End: 2023-05-12

## 2023-05-07 RX ORDER — BENZOCAINE AND DEXTROMETHORPHAN HYDROBROMIDE 7.5; 5 MG/1; MG/1
1 LOZENGE ORAL
Qty: 20 LOZENGE | Refills: 0 | Status: SHIPPED | OUTPATIENT
Start: 2023-05-07 | End: 2023-05-12

## 2023-05-07 NOTE — ED QUICK NOTES
DISCHARGE INSTRUCTIONS REVIEWED WITH THE PATIENT. ALL QUESTIONS ANSWERED, PT VERBALIZED UNDERSTANDING. RX AND FOLLOW UP EXPLAINED. PT AMBULATORY TO EXIT.

## 2023-05-12 ENCOUNTER — OFFICE VISIT (OUTPATIENT)
Dept: INTERNAL MEDICINE CLINIC | Facility: CLINIC | Age: 33
End: 2023-05-12

## 2023-05-12 VITALS
HEART RATE: 78 BPM | SYSTOLIC BLOOD PRESSURE: 128 MMHG | RESPIRATION RATE: 17 BRPM | OXYGEN SATURATION: 98 % | HEIGHT: 69 IN | BODY MASS INDEX: 26.66 KG/M2 | TEMPERATURE: 98 F | DIASTOLIC BLOOD PRESSURE: 76 MMHG | WEIGHT: 180 LBS

## 2023-05-12 DIAGNOSIS — Z00.00 ANNUAL PHYSICAL EXAM: Primary | ICD-10-CM

## 2023-05-12 DIAGNOSIS — Z82.49: ICD-10-CM

## 2023-05-12 PROCEDURE — 36415 COLL VENOUS BLD VENIPUNCTURE: CPT | Performed by: INTERNAL MEDICINE

## 2023-05-12 PROCEDURE — 3074F SYST BP LT 130 MM HG: CPT | Performed by: INTERNAL MEDICINE

## 2023-05-12 PROCEDURE — 3078F DIAST BP <80 MM HG: CPT | Performed by: INTERNAL MEDICINE

## 2023-05-12 PROCEDURE — 3008F BODY MASS INDEX DOCD: CPT | Performed by: INTERNAL MEDICINE

## 2023-05-13 LAB
ALBUMIN SERPL-MCNC: 3.9 G/DL (ref 3.4–5)
ALBUMIN/GLOB SERPL: 1.2 {RATIO} (ref 1–2)
ALP LIVER SERPL-CCNC: 54 U/L
ALT SERPL-CCNC: 37 U/L
ANION GAP SERPL CALC-SCNC: 7 MMOL/L (ref 0–18)
AST SERPL-CCNC: 20 U/L (ref 15–37)
BASOPHILS # BLD AUTO: 0.05 X10(3) UL (ref 0–0.2)
BASOPHILS NFR BLD AUTO: 0.7 %
BILIRUB SERPL-MCNC: 0.9 MG/DL (ref 0.1–2)
BILIRUB UR QL: NEGATIVE
BUN BLD-MCNC: 19 MG/DL (ref 7–18)
BUN/CREAT SERPL: 18.4 (ref 10–20)
CALCIUM BLD-MCNC: 9.3 MG/DL (ref 8.5–10.1)
CHLORIDE SERPL-SCNC: 107 MMOL/L (ref 98–112)
CHOLEST SERPL-MCNC: 117 MG/DL (ref ?–200)
CLARITY UR: CLEAR
CO2 SERPL-SCNC: 25 MMOL/L (ref 21–32)
COLOR UR: YELLOW
CREAT BLD-MCNC: 1.03 MG/DL
DEPRECATED RDW RBC AUTO: 42.3 FL (ref 35.1–46.3)
EOSINOPHIL # BLD AUTO: 0.4 X10(3) UL (ref 0–0.7)
EOSINOPHIL NFR BLD AUTO: 5.3 %
ERYTHROCYTE [DISTWIDTH] IN BLOOD BY AUTOMATED COUNT: 12.4 % (ref 11–15)
FASTING PATIENT LIPID ANSWER: YES
FASTING STATUS PATIENT QL REPORTED: YES
GFR SERPLBLD BASED ON 1.73 SQ M-ARVRAT: 100 ML/MIN/1.73M2 (ref 60–?)
GLOBULIN PLAS-MCNC: 3.3 G/DL (ref 2.8–4.4)
GLUCOSE BLD-MCNC: 88 MG/DL (ref 70–99)
GLUCOSE UR-MCNC: NORMAL MG/DL
HCT VFR BLD AUTO: 48 %
HDLC SERPL-MCNC: 34 MG/DL (ref 40–59)
HGB BLD-MCNC: 15.4 G/DL
HGB UR QL STRIP.AUTO: NEGATIVE
IMM GRANULOCYTES # BLD AUTO: 0.02 X10(3) UL (ref 0–1)
IMM GRANULOCYTES NFR BLD: 0.3 %
KETONES UR-MCNC: NEGATIVE MG/DL
LDLC SERPL CALC-MCNC: 58 MG/DL (ref ?–100)
LEUKOCYTE ESTERASE UR QL STRIP.AUTO: 25
LYMPHOCYTES # BLD AUTO: 1.2 X10(3) UL (ref 1–4)
LYMPHOCYTES NFR BLD AUTO: 16 %
MCH RBC QN AUTO: 30.4 PG (ref 26–34)
MCHC RBC AUTO-ENTMCNC: 32.1 G/DL (ref 31–37)
MCV RBC AUTO: 94.7 FL
MONOCYTES # BLD AUTO: 0.52 X10(3) UL (ref 0.1–1)
MONOCYTES NFR BLD AUTO: 6.9 %
NEUTROPHILS # BLD AUTO: 5.3 X10 (3) UL (ref 1.5–7.7)
NEUTROPHILS # BLD AUTO: 5.3 X10(3) UL (ref 1.5–7.7)
NEUTROPHILS NFR BLD AUTO: 70.8 %
NITRITE UR QL STRIP.AUTO: NEGATIVE
NONHDLC SERPL-MCNC: 83 MG/DL (ref ?–130)
OSMOLALITY SERPL CALC.SUM OF ELEC: 290 MOSM/KG (ref 275–295)
PH UR: 5.5 [PH] (ref 5–8)
PLATELET # BLD AUTO: 247 10(3)UL (ref 150–450)
POTASSIUM SERPL-SCNC: 4 MMOL/L (ref 3.5–5.1)
PROT SERPL-MCNC: 7.2 G/DL (ref 6.4–8.2)
PROT UR-MCNC: 30 MG/DL
RBC # BLD AUTO: 5.07 X10(6)UL
SODIUM SERPL-SCNC: 139 MMOL/L (ref 136–145)
SP GR UR STRIP: >1.03 (ref 1–1.03)
TRIGL SERPL-MCNC: 140 MG/DL (ref 30–149)
TSI SER-ACNC: 0.94 MIU/ML (ref 0.36–3.74)
UROBILINOGEN UR STRIP-ACNC: NORMAL
VLDLC SERPL CALC-MCNC: 21 MG/DL (ref 0–30)
WBC # BLD AUTO: 7.5 X10(3) UL (ref 4–11)

## 2023-10-26 ENCOUNTER — TELEPHONE (OUTPATIENT)
Dept: INTERNAL MEDICINE CLINIC | Facility: CLINIC | Age: 33
End: 2023-10-26

## 2023-11-03 ENCOUNTER — OFFICE VISIT (OUTPATIENT)
Dept: INTERNAL MEDICINE CLINIC | Facility: CLINIC | Age: 33
End: 2023-11-03

## 2023-11-03 VITALS
HEART RATE: 78 BPM | WEIGHT: 186 LBS | TEMPERATURE: 98 F | DIASTOLIC BLOOD PRESSURE: 68 MMHG | RESPIRATION RATE: 19 BRPM | HEIGHT: 69 IN | BODY MASS INDEX: 27.55 KG/M2 | OXYGEN SATURATION: 98 % | SYSTOLIC BLOOD PRESSURE: 122 MMHG

## 2023-11-03 DIAGNOSIS — R82.90 ABNORMAL URINE ODOR: Primary | ICD-10-CM

## 2023-11-03 DIAGNOSIS — R30.0 DYSURIA: ICD-10-CM

## 2023-11-03 PROCEDURE — 3008F BODY MASS INDEX DOCD: CPT | Performed by: INTERNAL MEDICINE

## 2023-11-03 PROCEDURE — 3074F SYST BP LT 130 MM HG: CPT | Performed by: INTERNAL MEDICINE

## 2023-11-03 PROCEDURE — 99213 OFFICE O/P EST LOW 20 MIN: CPT | Performed by: INTERNAL MEDICINE

## 2023-11-03 PROCEDURE — 3078F DIAST BP <80 MM HG: CPT | Performed by: INTERNAL MEDICINE

## 2023-11-03 NOTE — PROGRESS NOTES
Subjective:     Patient ID: Stan Dias is a 33year old male. HPI    Patient comes in today with complaint of having some abnormal smell of the urine smelled like cereal color looks okay but also has been having some discomfort when he urinates today the discomfort has disappeared drinks enough fluids denies any flank pain no fever no chills no blood in the urine    History/Other:   Review of Systems   Constitutional: Negative. Negative for fatigue and fever. HENT: Negative. Negative for congestion. Eyes: Negative. Respiratory: Negative. Negative for cough, shortness of breath and wheezing. Cardiovascular: Negative. Negative for chest pain, palpitations and leg swelling. Gastrointestinal: Negative. Endocrine: Negative for cold intolerance and heat intolerance. Genitourinary:  Positive for dysuria. Negative for flank pain and hematuria. Musculoskeletal: Negative. Negative for arthralgias, back pain and myalgias. Skin: Negative. Neurological: Negative. Negative for dizziness, tremors, syncope, weakness and headaches. Psychiatric/Behavioral: Negative. Negative for agitation, behavioral problems and suicidal ideas. The patient is not nervous/anxious. Current Outpatient Medications   Medication Sig Dispense Refill    Dicyclomine HCl 20 MG Oral Tab Take 1 tablet (20 mg total) by mouth 4 (four) times daily as needed (Can be taken with or without food. Please try to avoid within 1 hour of your acid reflux medication. ). 20 tablet 0    Metoclopramide HCl 10 MG Oral Tab Take 1 tablet (10 mg total) by mouth 3 (three) times daily as needed (for nausea, or headache. ). 14 tablet 0     Allergies:No Known Allergies    Past Medical History:   Diagnosis Date    Acute blood loss anemia 2/25/2019    Acute febrile illness 2/24/2019    Hyperglycemia 2/24/2019    Hypokalemia 2/24/2019    Pancolitis ([facility] [address] 2/24/2019      No past surgical history on file.    Family History   Problem Relation Age of Onset    Other (aneurysm) Father     Hypertension Mother     Stroke Mother     Stroke Maternal Grandfather     Other (muscular dystrophy) Other       Social History:   Social History     Socioeconomic History    Marital status: Single   Tobacco Use    Smoking status: Former     Packs/day: 0.00     Years: 0.00     Additional pack years: 0.00     Total pack years: 0.00     Types: Cigarettes    Smokeless tobacco: Never   Vaping Use    Vaping Use: Never used   Substance and Sexual Activity    Alcohol use: Not Currently     Alcohol/week: 1.0 standard drink of alcohol     Comment: 2 drinks per week    Drug use: Not Currently     Comment: marijuana in past   Other Topics Concern    Caffeine Concern Yes     Comment: 2 times per week        Objective:   Physical Exam  Vitals and nursing note reviewed. Constitutional:       Appearance: He is well-developed. HENT:      Head: Normocephalic and atraumatic. Right Ear: External ear normal.      Left Ear: External ear normal.      Nose: Nose normal.   Eyes:      Conjunctiva/sclera: Conjunctivae normal.      Pupils: Pupils are equal, round, and reactive to light. Cardiovascular:      Rate and Rhythm: Normal rate and regular rhythm. Heart sounds: Normal heart sounds. Pulmonary:      Effort: Pulmonary effort is normal.      Breath sounds: Normal breath sounds. Abdominal:      General: Bowel sounds are normal.      Palpations: Abdomen is soft. Genitourinary:     Penis: Normal.       Prostate: Normal.      Rectum: Normal.   Musculoskeletal:         General: Normal range of motion. Cervical back: Normal range of motion and neck supple. Skin:     General: Skin is warm and dry. Neurological:      Mental Status: He is alert and oriented to person, place, and time. Deep Tendon Reflexes: Reflexes are normal and symmetric.          Assessment & Plan:   Abnormal urine odor  (primary encounter diagnosis)-urine dip looks okay will send to the lab continue to drink fluid let us know if any worsening symptoms or not getting better  Dysuria-improved we will follow the results of the urine    Orders Placed This Encounter      POC Urinalysis, Automated Dip without microscopy (PCA and EMMG ONLY) [02516]      UA/M WITH CULTURE REFLEX [3020]      Meds This Visit:  Requested Prescriptions      No prescriptions requested or ordered in this encounter       Imaging & Referrals:  None

## 2024-07-08 ENCOUNTER — EKG ENCOUNTER (OUTPATIENT)
Dept: LAB | Age: 34
End: 2024-07-08
Payer: COMMERCIAL

## 2024-07-08 ENCOUNTER — MED REC SCAN ONLY (OUTPATIENT)
Dept: INTERNAL MEDICINE CLINIC | Facility: CLINIC | Age: 34
End: 2024-07-08

## 2024-07-08 ENCOUNTER — OFFICE VISIT (OUTPATIENT)
Dept: INTERNAL MEDICINE CLINIC | Facility: CLINIC | Age: 34
End: 2024-07-08
Payer: COMMERCIAL

## 2024-07-08 VITALS
BODY MASS INDEX: 27.11 KG/M2 | SYSTOLIC BLOOD PRESSURE: 132 MMHG | WEIGHT: 183 LBS | OXYGEN SATURATION: 97 % | HEIGHT: 69 IN | HEART RATE: 59 BPM | DIASTOLIC BLOOD PRESSURE: 78 MMHG

## 2024-07-08 DIAGNOSIS — Z00.00 ROUTINE GENERAL MEDICAL EXAMINATION AT A HEALTH CARE FACILITY: Primary | ICD-10-CM

## 2024-07-08 DIAGNOSIS — R07.9 CHEST PAIN, UNSPECIFIED TYPE: ICD-10-CM

## 2024-07-08 DIAGNOSIS — Z82.49: ICD-10-CM

## 2024-07-08 LAB
ATRIAL RATE: 56 BPM
P AXIS: 61 DEGREES
P-R INTERVAL: 142 MS
Q-T INTERVAL: 426 MS
QRS DURATION: 92 MS
QTC CALCULATION (BEZET): 411 MS
R AXIS: 40 DEGREES
T AXIS: 35 DEGREES
VENTRICULAR RATE: 56 BPM

## 2024-07-08 PROCEDURE — 93010 ELECTROCARDIOGRAM REPORT: CPT | Performed by: INTERNAL MEDICINE

## 2024-07-08 PROCEDURE — 93005 ELECTROCARDIOGRAM TRACING: CPT

## 2024-07-08 NOTE — PROGRESS NOTES
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  The symptoms almost exclusively occur when working and feeling stressed. Chest pain is associated with dizziness and palpitations. He has not lost consciousness but felt pre-syncopal on at least one occasion while driving. Usually relaxing and taking deep breaths can resolve the symptoms after a few hours. He runs a lot and denies any symptoms during exertion. No personal history of anxiety or panic attacks. He does admit to heavy caffeine use, 3-4 medium cups of coffee which are a mix of store-bought and homemade, as well as some days a Celsius energy drink as well. He works driving at night time so needs the caffeine to stay awake. States his sleep is \"okay\", sometimes wakes up during the night.     He has a family history of arrhythmogenic right ventricular cardiomyopathy in his mother, brother, and maternal grandfather. He was speaking to his family and they stated this was the age they started having symptoms. Has had a cardiac workup with Dr. Turner in 2019 including echocardiogram.  Study Conclusions   1. Left ventricle: The cavity size was normal. Wall thickness was      normal. Systolic function was normal. The estimated ejection      fraction was 60%. Wall motion was normal; there were no regional      wall motion abnormalities. Left ventricular diastolic function      parameters were normal.   2. Mitral valve: Mild regurgitation.   3. Right ventricle: The cavity size was dilated.   4. Right atrium: The atrium was mildly dilated.   5. Tricuspid valve: Mild-moderate regurgitation.   6. Impressions: The right ventricular systolic pressure was      increased consistent with mild pulmonary hypertension.     Denies smoking  or drug use   Former smoker, quit 5 years ago - was smoking a pack a week, for about 6-7 years   No vaping or marijuana  Denies alcohol use     History/Other:    Chief Complaint Reviewed and Verified  No Further Nursing Notes to   Review  Tobacco Reviewed  Family History Reviewed         Tobacco:  He smoked tobacco in the past but quit greater than 12 months ago.  Social History     Tobacco Use   Smoking Status Former    Current packs/day: 0.00    Types: Cigarettes   Smokeless Tobacco Never        No current outpatient medications on file.         Review of Systems:  Review of Systems  10 point review of systems otherwise negative with the exception of HPI and assessment and plan    Objective:   /78   Pulse 59   Ht 5' 9\" (1.753 m)   Wt 183 lb (83 kg)   SpO2 97%   BMI 27.02 kg/m²  Estimated body mass index is 27.02 kg/m² as calculated from the following:    Height as of this encounter: 5' 9\" (1.753 m).    Weight as of this encounter: 183 lb (83 kg).  Physical Exam  Vitals reviewed.   Constitutional:       General: He is not in acute distress.     Appearance: Normal appearance. He is well-developed.   HENT:      Right Ear: Tympanic membrane normal.      Left Ear: Tympanic membrane normal.      Mouth/Throat:      Mouth: Mucous membranes are moist.      Pharynx: Oropharynx is clear.   Cardiovascular:      Rate and Rhythm: Normal rate and regular rhythm.      Heart sounds: Murmur heard.   Pulmonary:      Effort: Pulmonary effort is normal.      Breath sounds: Normal breath sounds.   Abdominal:      General: Bowel sounds are normal.      Palpations: Abdomen is soft.   Lymphadenopathy:      Cervical: No cervical adenopathy.   Skin:     General: Skin is warm and dry.   Neurological:      Mental Status: He is alert and oriented to person, place, and time.       Assessment & Plan:   1. Routine general medical examination at a health care facility (Primary)  -     CBC With Differential With Platelet; Future;  Expected date: 07/08/2024  -     Comp Metabolic Panel (14); Future; Expected date: 07/08/2024  -     Lipid Panel; Future; Expected date: 07/08/2024  -     TSH W Reflex To Free T4; Future; Expected date: 07/08/2024  Return fasting for labs  2. FHx: arrhythmogenic right ventricular cardiomyopathy  -     CARD ECHO 2D DOPPLER (CPT=93306); Future; Expected date: 07/08/2024  -     [facility] CARDIOLOGY EXTERNAL  -     EKG 12 Lead; Future; Expected date: 07/08/2024  Will have EKG today and schedule echo and cardiology appt  3. Chest pain, unspecified type  -     CARD ECHO 2D DOPPLER (CPT=93306); Future; Expected date: 07/08/2024  -     [facility] CARDIOLOGY EXTERNAL  -     EKG 12 Lead; Future; Expected date: 07/08/2024  Given family history will recheck EKG and echo and have him follow up with cardiology  Reduce caffeine and salt intake     CROW Medina, 7/8/2024, 1:08 PM

## 2024-07-15 ENCOUNTER — LAB ENCOUNTER (OUTPATIENT)
Dept: LAB | Age: 34
End: 2024-07-15
Payer: COMMERCIAL

## 2024-07-15 DIAGNOSIS — R17 SERUM TOTAL BILIRUBIN ELEVATED: ICD-10-CM

## 2024-07-15 DIAGNOSIS — Z00.00 ROUTINE GENERAL MEDICAL EXAMINATION AT A HEALTH CARE FACILITY: ICD-10-CM

## 2024-07-15 LAB
ALBUMIN SERPL-MCNC: 4.9 G/DL (ref 3.2–4.8)
ALBUMIN/GLOB SERPL: 1.8 {RATIO} (ref 1–2)
ALP LIVER SERPL-CCNC: 64 U/L
ALT SERPL-CCNC: 29 U/L
ANION GAP SERPL CALC-SCNC: 7 MMOL/L (ref 0–18)
AST SERPL-CCNC: 20 U/L (ref ?–34)
BASOPHILS # BLD AUTO: 0.04 X10(3) UL (ref 0–0.2)
BASOPHILS NFR BLD AUTO: 0.5 %
BILIRUB SERPL-MCNC: 2.4 MG/DL (ref 0.3–1.2)
BUN BLD-MCNC: 10 MG/DL (ref 9–23)
BUN/CREAT SERPL: 9.8 (ref 10–20)
CALCIUM BLD-MCNC: 9.6 MG/DL (ref 8.7–10.4)
CHLORIDE SERPL-SCNC: 105 MMOL/L (ref 98–112)
CHOLEST SERPL-MCNC: 139 MG/DL (ref ?–200)
CO2 SERPL-SCNC: 29 MMOL/L (ref 21–32)
CREAT BLD-MCNC: 1.02 MG/DL
DEPRECATED RDW RBC AUTO: 36.7 FL (ref 35.1–46.3)
EGFRCR SERPLBLD CKD-EPI 2021: 100 ML/MIN/1.73M2 (ref 60–?)
EOSINOPHIL # BLD AUTO: 0.18 X10(3) UL (ref 0–0.7)
EOSINOPHIL NFR BLD AUTO: 2.1 %
ERYTHROCYTE [DISTWIDTH] IN BLOOD BY AUTOMATED COUNT: 11.5 % (ref 11–15)
FASTING PATIENT LIPID ANSWER: YES
FASTING STATUS PATIENT QL REPORTED: YES
GLOBULIN PLAS-MCNC: 2.8 G/DL (ref 2–3.5)
GLUCOSE BLD-MCNC: 88 MG/DL (ref 70–99)
HCT VFR BLD AUTO: 45.4 %
HDLC SERPL-MCNC: 40 MG/DL (ref 40–59)
HGB BLD-MCNC: 15.9 G/DL
IMM GRANULOCYTES # BLD AUTO: 0.01 X10(3) UL (ref 0–1)
IMM GRANULOCYTES NFR BLD: 0.1 %
LDLC SERPL CALC-MCNC: 80 MG/DL (ref ?–100)
LYMPHOCYTES # BLD AUTO: 1.3 X10(3) UL (ref 1–4)
LYMPHOCYTES NFR BLD AUTO: 14.8 %
MCH RBC QN AUTO: 31.3 PG (ref 26–34)
MCHC RBC AUTO-ENTMCNC: 35 G/DL (ref 31–37)
MCV RBC AUTO: 89.4 FL
MONOCYTES # BLD AUTO: 0.65 X10(3) UL (ref 0.1–1)
MONOCYTES NFR BLD AUTO: 7.4 %
NEUTROPHILS # BLD AUTO: 6.58 X10 (3) UL (ref 1.5–7.7)
NEUTROPHILS # BLD AUTO: 6.58 X10(3) UL (ref 1.5–7.7)
NEUTROPHILS NFR BLD AUTO: 75.1 %
NONHDLC SERPL-MCNC: 99 MG/DL (ref ?–130)
OSMOLALITY SERPL CALC.SUM OF ELEC: 290 MOSM/KG (ref 275–295)
PLATELET # BLD AUTO: 194 10(3)UL (ref 150–450)
POTASSIUM SERPL-SCNC: 4.2 MMOL/L (ref 3.5–5.1)
PROT SERPL-MCNC: 7.7 G/DL (ref 5.7–8.2)
RBC # BLD AUTO: 5.08 X10(6)UL
SODIUM SERPL-SCNC: 141 MMOL/L (ref 136–145)
T3FREE SERPL-MCNC: 2.88 PG/ML (ref 2.4–4.2)
T4 FREE SERPL-MCNC: 1.1 NG/DL (ref 0.8–1.7)
TRIGL SERPL-MCNC: 101 MG/DL (ref 30–149)
TSI SER-ACNC: 0.47 MIU/ML (ref 0.55–4.78)
VLDLC SERPL CALC-MCNC: 16 MG/DL (ref 0–30)
WBC # BLD AUTO: 8.8 X10(3) UL (ref 4–11)

## 2024-07-15 PROCEDURE — 80061 LIPID PANEL: CPT

## 2024-07-15 PROCEDURE — 85025 COMPLETE CBC W/AUTO DIFF WBC: CPT

## 2024-07-15 PROCEDURE — 82248 BILIRUBIN DIRECT: CPT

## 2024-07-15 PROCEDURE — 84443 ASSAY THYROID STIM HORMONE: CPT

## 2024-07-15 PROCEDURE — 84439 ASSAY OF FREE THYROXINE: CPT

## 2024-07-15 PROCEDURE — 80053 COMPREHEN METABOLIC PANEL: CPT

## 2024-07-15 PROCEDURE — 84481 FREE ASSAY (FT-3): CPT

## 2024-07-15 PROCEDURE — 36415 COLL VENOUS BLD VENIPUNCTURE: CPT

## 2024-07-17 DIAGNOSIS — R17 SERUM TOTAL BILIRUBIN ELEVATED: Primary | ICD-10-CM

## 2024-07-17 LAB — BILIRUB DIRECT SERPL-MCNC: 0.8 MG/DL (ref ?–0.3)

## 2024-07-18 DIAGNOSIS — R79.89 HIGH DIRECT BILIRUBIN: ICD-10-CM

## 2024-07-18 DIAGNOSIS — R17 SERUM TOTAL BILIRUBIN ELEVATED: Primary | ICD-10-CM

## 2024-07-26 ENCOUNTER — HOSPITAL ENCOUNTER (OUTPATIENT)
Dept: CV DIAGNOSTICS | Facility: HOSPITAL | Age: 34
Discharge: HOME | End: 2024-07-26
Payer: COMMERCIAL

## 2024-07-26 DIAGNOSIS — R07.9 CHEST PAIN, UNSPECIFIED TYPE: ICD-10-CM

## 2024-07-26 DIAGNOSIS — Z82.49: ICD-10-CM

## 2024-07-26 PROCEDURE — 93306 TTE W/DOPPLER COMPLETE: CPT

## 2024-08-12 ENCOUNTER — HOSPITAL ENCOUNTER (OUTPATIENT)
Dept: CT IMAGING | Age: 34
Discharge: HOME | End: 2024-08-12
Attending: INTERNAL MEDICINE
Payer: COMMERCIAL

## 2024-08-12 DIAGNOSIS — R07.9 CHEST PAIN, UNSPECIFIED: ICD-10-CM

## 2024-08-12 PROCEDURE — 71260 CT THORAX DX C+: CPT | Performed by: INTERNAL MEDICINE

## 2024-09-16 ENCOUNTER — HOSPITAL ENCOUNTER (OUTPATIENT)
Dept: MRI IMAGING | Facility: HOSPITAL | Age: 34
Discharge: HOME | End: 2024-09-16
Attending: INTERNAL MEDICINE
Payer: COMMERCIAL

## 2024-09-16 DIAGNOSIS — R07.9 CHEST PAIN, UNSPECIFIED: ICD-10-CM

## 2024-09-16 PROCEDURE — 75561 CARDIAC MRI FOR MORPH W/DYE: CPT | Performed by: INTERNAL MEDICINE

## 2024-09-16 PROCEDURE — A9575 INJ GADOTERATE MEGLUMI 0.1ML: HCPCS | Performed by: INTERNAL MEDICINE

## 2024-09-16 RX ORDER — GADOTERATE MEGLUMINE 376.9 MG/ML
20 INJECTION INTRAVENOUS
Status: COMPLETED | OUTPATIENT
Start: 2024-09-16 | End: 2024-09-16

## 2024-09-16 RX ADMIN — GADOTERATE MEGLUMINE 20 ML: 376.9 INJECTION INTRAVENOUS at 08:36:00

## 2024-10-14 ENCOUNTER — LAB ENCOUNTER (OUTPATIENT)
Dept: LAB | Age: 34
End: 2024-10-14
Payer: COMMERCIAL

## 2024-10-14 DIAGNOSIS — R17 SERUM TOTAL BILIRUBIN ELEVATED: ICD-10-CM

## 2024-10-14 DIAGNOSIS — R79.89 HIGH DIRECT BILIRUBIN: ICD-10-CM

## 2024-10-14 LAB
ALBUMIN SERPL-MCNC: 4.9 G/DL (ref 3.2–4.8)
ALP LIVER SERPL-CCNC: 55 U/L
ALT SERPL-CCNC: 31 U/L
AST SERPL-CCNC: 23 U/L (ref ?–34)
BILIRUB DIRECT SERPL-MCNC: 0.5 MG/DL (ref ?–0.3)
BILIRUB SERPL-MCNC: 1.6 MG/DL (ref 0.3–1.2)
HAPTOGLOB SERPL-MCNC: 106 MG/DL (ref 30–200)
HGB RETIC QN AUTO: 35.7 PG (ref 28.2–36.6)
IMM RETICS NFR: 0.07 RATIO (ref 0.1–0.3)
LDH SERPL L TO P-CCNC: 164 U/L
PROT SERPL-MCNC: 7.3 G/DL (ref 5.7–8.2)
RETICS # AUTO: 65 X10(3) UL (ref 22.5–147.5)
RETICS/RBC NFR AUTO: 1.3 %

## 2024-10-14 PROCEDURE — 36415 COLL VENOUS BLD VENIPUNCTURE: CPT

## 2024-10-14 PROCEDURE — 83010 ASSAY OF HAPTOGLOBIN QUANT: CPT

## 2024-10-14 PROCEDURE — 85045 AUTOMATED RETICULOCYTE COUNT: CPT

## 2024-10-14 PROCEDURE — 83615 LACTATE (LD) (LDH) ENZYME: CPT

## 2024-10-14 PROCEDURE — 80076 HEPATIC FUNCTION PANEL: CPT

## 2025-05-28 NOTE — ED PROVIDER NOTES
Health Maintenance       COVID-19 Vaccine (3 - 2024-25 season)  Overdue since 9/1/2024    Diabetes Foot Exam (Yearly)  Overdue since 3/5/2025    Diabetes Eye Exam (Yearly)  Never done    Shingles Vaccine (1 of 2)  Never done    Respiratory Syncytial Virus (RSV) Vaccine 60+ (1 - Risk 60-74 years 1-dose series)  Never done           Following review of the above:  Patient wishes to discuss with clinician: Diabetes Eye Exam and Diabetes Foot Exam  Patient is not proceeding with: COVID-19, Respiratory Syncytial Virus (RSV), and Shingles will get both from pharmacy     Note: Refer to final orders and clinician documentation.       Patient Seen in: [facility]      History   Patient presents with:  Abdominal Pain    Stated Complaint: STOMACH PAINS    HPI/Subjective:   Yanely Barakat is a 35year old male, is here for complaints of burning epigastric Pain that star Never used    Alcohol use: Yes      Comment: 2 drinks per week    Drug use: Not Currently      Comment: marijuana in past             Review of Systems    Positive for stated complaint: STOMACH PAINS  Other systems are as noted in HPI.   Constitutional and negative. History of Covid   Received his first vaccine    Emesis x1 at home resolved with medication here of dicyclomine, Viscous Lidocaine, and Maalox. No episodes of diarrhea. Symptoms are better, and not worsened since medication administration.   No Disposition and Plan     Clinical Impression:  Gastroesophageal reflux disease, unspecified whether esophagitis present  (primary encounter diagnosis)  Acute gastritis without hemorrhage, unspecified gastritis type     Disposition:  Discharge  4/19

## 2025-07-14 ENCOUNTER — OFFICE VISIT (OUTPATIENT)
Dept: INTERNAL MEDICINE CLINIC | Facility: CLINIC | Age: 35
End: 2025-07-14
Payer: COMMERCIAL

## 2025-07-14 VITALS
HEART RATE: 63 BPM | DIASTOLIC BLOOD PRESSURE: 70 MMHG | RESPIRATION RATE: 18 BRPM | TEMPERATURE: 98 F | BODY MASS INDEX: 27.99 KG/M2 | OXYGEN SATURATION: 99 % | WEIGHT: 189 LBS | SYSTOLIC BLOOD PRESSURE: 127 MMHG | HEIGHT: 69 IN

## 2025-07-14 DIAGNOSIS — Z82.49: ICD-10-CM

## 2025-07-14 DIAGNOSIS — Z82.0: ICD-10-CM

## 2025-07-14 DIAGNOSIS — Z00.00 ANNUAL PHYSICAL EXAM: Primary | ICD-10-CM

## 2025-07-14 DIAGNOSIS — I51.7 RIGHT VENTRICULAR DILATION: ICD-10-CM

## 2025-07-14 DIAGNOSIS — I37.1 MILD PULMONIC REGURGITATION AND RV DILATION BY PRIOR ECHOCARDIOGRAM: ICD-10-CM

## 2025-07-14 DIAGNOSIS — I51.7 MILD PULMONIC REGURGITATION AND RV DILATION BY PRIOR ECHOCARDIOGRAM: ICD-10-CM

## 2025-07-14 LAB
ALBUMIN SERPL-MCNC: 5.1 G/DL (ref 3.2–4.8)
ALBUMIN/GLOB SERPL: 2.4 {RATIO} (ref 1–2)
ALP LIVER SERPL-CCNC: 48 U/L (ref 45–117)
ALT SERPL-CCNC: 40 U/L (ref 10–49)
ANION GAP SERPL CALC-SCNC: 10 MMOL/L (ref 0–18)
AST SERPL-CCNC: 35 U/L (ref ?–34)
BASOPHILS # BLD AUTO: 0.06 X10(3) UL (ref 0–0.2)
BASOPHILS NFR BLD AUTO: 0.9 %
BILIRUB SERPL-MCNC: 3.2 MG/DL (ref 0.3–1.2)
BILIRUB UR QL: NEGATIVE
BUN BLD-MCNC: 15 MG/DL (ref 9–23)
BUN/CREAT SERPL: 12.5 (ref 10–20)
CALCIUM BLD-MCNC: 9.5 MG/DL (ref 8.7–10.4)
CHLORIDE SERPL-SCNC: 103 MMOL/L (ref 98–112)
CHOLEST SERPL-MCNC: 158 MG/DL (ref ?–200)
CO2 SERPL-SCNC: 26 MMOL/L (ref 21–32)
CREAT BLD-MCNC: 1.2 MG/DL (ref 0.7–1.3)
DEPRECATED RDW RBC AUTO: 39.4 FL (ref 35.1–46.3)
EGFRCR SERPLBLD CKD-EPI 2021: 82 ML/MIN/1.73M2 (ref 60–?)
EOSINOPHIL # BLD AUTO: 0.32 X10(3) UL (ref 0–0.7)
EOSINOPHIL NFR BLD AUTO: 4.9 %
ERYTHROCYTE [DISTWIDTH] IN BLOOD BY AUTOMATED COUNT: 11.8 % (ref 11–15)
FASTING PATIENT LIPID ANSWER: YES
FASTING STATUS PATIENT QL REPORTED: YES
GLOBULIN PLAS-MCNC: 2.1 G/DL (ref 2–3.5)
GLUCOSE BLD-MCNC: 75 MG/DL (ref 70–99)
GLUCOSE UR-MCNC: NORMAL MG/DL
HCT VFR BLD AUTO: 46.2 % (ref 39–53)
HDLC SERPL-MCNC: 43 MG/DL (ref 40–59)
HGB BLD-MCNC: 16 G/DL (ref 13–17.5)
HGB UR QL STRIP.AUTO: NEGATIVE
IMM GRANULOCYTES # BLD AUTO: 0.01 X10(3) UL (ref 0–1)
IMM GRANULOCYTES NFR BLD: 0.2 %
KETONES UR-MCNC: NEGATIVE MG/DL
LDLC SERPL CALC-MCNC: 93 MG/DL (ref ?–100)
LEUKOCYTE ESTERASE UR QL STRIP.AUTO: 75
LYMPHOCYTES # BLD AUTO: 2.1 X10(3) UL (ref 1–4)
LYMPHOCYTES NFR BLD AUTO: 32.3 %
MCH RBC QN AUTO: 31.5 PG (ref 26–34)
MCHC RBC AUTO-ENTMCNC: 34.6 G/DL (ref 31–37)
MCV RBC AUTO: 90.9 FL (ref 80–100)
MONOCYTES # BLD AUTO: 0.46 X10(3) UL (ref 0.1–1)
MONOCYTES NFR BLD AUTO: 7.1 %
NEUTROPHILS # BLD AUTO: 3.55 X10 (3) UL (ref 1.5–7.7)
NEUTROPHILS # BLD AUTO: 3.55 X10(3) UL (ref 1.5–7.7)
NEUTROPHILS NFR BLD AUTO: 54.6 %
NITRITE UR QL STRIP.AUTO: NEGATIVE
NONHDLC SERPL-MCNC: 115 MG/DL (ref ?–130)
OSMOLALITY SERPL CALC.SUM OF ELEC: 288 MOSM/KG (ref 275–295)
PH UR: 5.5 [PH] (ref 5–8)
PLATELET # BLD AUTO: 230 10(3)UL (ref 150–450)
POTASSIUM SERPL-SCNC: 3.7 MMOL/L (ref 3.5–5.1)
PROT SERPL-MCNC: 7.2 G/DL (ref 5.7–8.2)
PROT UR-MCNC: 30 MG/DL
RBC # BLD AUTO: 5.08 X10(6)UL (ref 4.3–5.7)
SODIUM SERPL-SCNC: 139 MMOL/L (ref 136–145)
SP GR UR STRIP: >1.03 (ref 1–1.03)
TRIGL SERPL-MCNC: 120 MG/DL (ref 30–149)
TSI SER-ACNC: 2.04 UIU/ML (ref 0.55–4.78)
UROBILINOGEN UR STRIP-ACNC: NORMAL
VLDLC SERPL CALC-MCNC: 20 MG/DL (ref 0–30)
WBC # BLD AUTO: 6.5 X10(3) UL (ref 4–11)

## 2025-07-14 PROCEDURE — 36415 COLL VENOUS BLD VENIPUNCTURE: CPT | Performed by: INTERNAL MEDICINE

## 2025-07-14 NOTE — PROGRESS NOTES
Subjective:     Patient ID: Valerio Dobson is a 34 year old male.    HPI  Patient comes in today for annual physical overall doing okay denies any complaints does have family history of muscular dystrophy and family history of arrhythmogenic right ventricle cardiomyopathy patient has had gene testing for muscular dystrophy and is negative has had echo and MRI of the heart with mild right ventricular dilatation and some mild valve changes initial echo was 2019 then repeat was 2024 with no changes.  History/Other:   Review of Systems   Constitutional: Negative.    HENT: Negative.     Eyes: Negative.    Respiratory: Negative.     Cardiovascular: Negative.    Gastrointestinal: Negative.    Genitourinary: Negative.    Musculoskeletal: Negative.    Skin: Negative.    Neurological: Negative.    Psychiatric/Behavioral: Negative.       Current Medications[1]  Allergies:Allergies[2]    Past Medical History[3]   Past Surgical History[4]   Family History[5]   Social History: Short Social Hx on File[6]     Objective:   Physical Exam  Vitals and nursing note reviewed.   Constitutional:       Appearance: He is well-developed.   HENT:      Head: Normocephalic and atraumatic.      Right Ear: External ear normal.      Left Ear: External ear normal.      Nose: Nose normal.   Eyes:      Conjunctiva/sclera: Conjunctivae normal.      Pupils: Pupils are equal, round, and reactive to light.   Cardiovascular:      Rate and Rhythm: Normal rate and regular rhythm.      Heart sounds: Normal heart sounds.   Pulmonary:      Effort: Pulmonary effort is normal.      Breath sounds: Normal breath sounds.   Abdominal:      General: Bowel sounds are normal.      Palpations: Abdomen is soft.   Genitourinary:     Penis: Normal.       Prostate: Normal.      Rectum: Normal.   Musculoskeletal:         General: Normal range of motion.      Cervical back: Normal range of motion and neck supple.   Skin:     General: Skin is warm and dry.   Neurological:       Mental Status: He is alert and oriented to person, place, and time.      Deep Tendon Reflexes: Reflexes are normal and symmetric.         Assessment & Plan:   1. Annual physical exam exam is okay will order labs   2. FHx: muscular dystrophy patient negative for gene abnormality   3. Mild pulmonic regurgitation and RV dilation by prior echocardiogram stable had an echo last year   4. Right ventricular dilation stable had echo last year will monitor   5. FHx: arrhythmogenic right ventricular cardiomyopathy as above will monitor       Orders Placed This Encounter   Procedures    CBC With Differential With Platelet    Comp Metabolic Panel (14)    Lipid Panel    TSH W Reflex To Free T4    Urinalysis, Routine       Meds This Visit:  Requested Prescriptions      No prescriptions requested or ordered in this encounter       Imaging & Referrals:  None            [1]   No current outpatient medications on file.   [2] No Known Allergies  [3]   Past Medical History:   Acute blood loss anemia    Acute febrile illness    Hyperglycemia    Hypokalemia    Pancolitis (HCC)   [4] No past surgical history on file.  [5]   Family History  Problem Relation Age of Onset    Hypertension Mother     Stroke Mother     Heart Disease Mother     Other (brain aneurysm) Father     Heart Disease Brother     No Known Problems Maternal Grandmother     Heart Disease Maternal Grandfather     Stroke Maternal Grandfather     Other (muscular dystrophy) Other     Heart Disease Maternal Uncle    [6]   Social History  Socioeconomic History    Marital status: Single   Tobacco Use    Smoking status: Former     Current packs/day: 0.00     Types: Cigarettes     Passive exposure: Never    Smokeless tobacco: Never   Vaping Use    Vaping status: Never Used   Substance and Sexual Activity    Alcohol use: Yes     Alcohol/week: 2.0 standard drinks of alcohol     Types: 2 Cans of beer per week     Comment: 2 drinks per week    Drug use: Not Currently     Comment:  marijuana in past   Other Topics Concern    Caffeine Concern Yes     Comment: 2 times per week

## 2025-07-17 DIAGNOSIS — R17 ELEVATED BILIRUBIN: Primary | ICD-10-CM

## 2025-07-17 DIAGNOSIS — R79.89 ELEVATED LFTS: ICD-10-CM

## 2025-07-17 LAB — BILIRUB DIRECT SERPL-MCNC: 0.9 MG/DL (ref ?–0.3)

## 2025-07-28 ENCOUNTER — HOSPITAL ENCOUNTER (OUTPATIENT)
Dept: ULTRASOUND IMAGING | Age: 35
Discharge: HOME | End: 2025-07-28
Attending: INTERNAL MEDICINE
Payer: COMMERCIAL

## 2025-07-28 DIAGNOSIS — R17 ELEVATED BILIRUBIN: ICD-10-CM

## 2025-07-28 PROCEDURE — 76705 ECHO EXAM OF ABDOMEN: CPT | Performed by: INTERNAL MEDICINE

## 2025-08-25 ENCOUNTER — OFFICE VISIT (OUTPATIENT)
Facility: CLINIC | Age: 35
End: 2025-08-25

## 2025-08-25 VITALS
WEIGHT: 191 LBS | HEIGHT: 69 IN | HEART RATE: 74 BPM | DIASTOLIC BLOOD PRESSURE: 83 MMHG | BODY MASS INDEX: 28.29 KG/M2 | SYSTOLIC BLOOD PRESSURE: 136 MMHG

## 2025-08-25 DIAGNOSIS — R12 HEARTBURN: ICD-10-CM

## 2025-08-25 DIAGNOSIS — K76.0 HEPATIC STEATOSIS: ICD-10-CM

## 2025-08-25 DIAGNOSIS — R17 ELEVATED BILIRUBIN: Primary | ICD-10-CM

## (undated) NOTE — LETTER
6/29/2020          To Whom It May Concern:    Denice Barragan is currently under my medical care. please excuse him for work for 2 days. Hopefully he will be able to return back to work on July 1, 2020 with no restrictions.   If you have any questions,

## (undated) NOTE — LETTER
21          [address] [place] [address]  :  1990      To Whom It May Concern: This patient is under my care. Due to medical condition, please excuse Mr. Dobson from work from  21 to 21. He may return to work on 21.       If this of

## (undated) NOTE — LETTER
This is to certify that Mr. Megan Dawson was hospitalized in [facility] from February 24, 2019 and discharged on February 27, 2019.   If his symptoms continue to improve, he will be able to return back to work with no restrictions on Ma

## (undated) NOTE — LETTER
7/1/2020          To Whom It May Concern:    Sara Brewster is currently under my medical care and may not return to work at this time. Please excuse him from work from 7/1 to 7/5. He may return on 7/6.   If you require additional information please c

## (undated) NOTE — LETTER
Date & Time: 4/19/2021, 6:47 PM  [facility]  Encounter Provider(s):    CROW Bee       To Whom It May Concern:    Mandi Kee was seen and treated in our department on 4/19/2021. He can return to work 04/22/2021.   Further w

## (undated) NOTE — LETTER
20          [address] [facility]  :  1990      To Whom It May Concern: This patient was seen in our office on 10/5/20. Mr. Veda Guillaume tested positive for Covid-19 on 10/31/20. He will need to quarantine for 14 days.       If this office may b

## (undated) NOTE — LETTER
4/20/2021          To Whom It May Concern:    Lisa Norris is currently under my medical care. Because of his medical conditions, he can return back to work now.   I am hoping that he will recover and he can resume his normal activities by tomorrow

## (undated) NOTE — LETTER
[address], [place]  Authorization for Invasive Procedures  1.  I hereby authorize Dr. Keagan Pearson , my physician and whomever may be designated as the doctor's assistant, to perform the following operation and/or procedure:  Lumb fever and allergic reactions, hemolytic reactions, transmission of disease such as hepatitis, AIDS, cytomegalovirus (CMV), and flluid overload.  In the event that I wish to have autologous transfusions of my own blood, or a directed donor transfusion, I yanet Signature of Patient:  ________________________________________________ Date: _________Time: _________    Responsible person in case of minor or unconscious: _____________________________Relationship: ____________     Witness Signature: ___________________

## (undated) NOTE — LETTER
11/10/2020          To Whom It May Concern:    Jessica Newman is currently under my medical care and may not return to work at this time. He may return to work on 11/16/2020. Activity is restricted as follows: none.     If you require additional i

## (undated) NOTE — LETTER
6/30/2020          To Whom It May Concern:    Esequiel Abarca is currently under my medical care. Because of his medical illness, He needs to be off for another 1 more day. He can return back to work with no restrictions on July 2, 2020.   If you req

## (undated) NOTE — ED AVS SNAPSHOT
[address] [facility]   MRN: [id number]    Department:  [facility] Emergency Department   Date of Visit:  9/12/2019           Disclosure     Insurance plans vary and the physician(s) referred by the ER may not be covered by your plan.  Please con CARE PHYSICIAN AT ONCE OR RETURN IMMEDIATELY TO THE EMERGENCY DEPARTMENT. If you have been prescribed any medication(s), please fill your prescription right away and begin taking the medication(s) as directed.   If you believe that any of the medications

## (undated) NOTE — LETTER
20        [address] [facility]  :  1990    To Whom It May Concern: This patient was seen in our office on 10/5/20. Mr. Bebeto Li tested positive for Covid-19 on 10/31/20. He will need to quarantine for 14 days.       If this office may be of

## (undated) NOTE — LETTER
11/18/2020              [facility] [id number]         Dear Marti Ramirez,    This letter is to inform you that our office has made several attempts to reach you by phone without [email or web address]   We were attempting to